# Patient Record
Sex: FEMALE | Race: BLACK OR AFRICAN AMERICAN | Employment: PART TIME | URBAN - NONMETROPOLITAN AREA
[De-identification: names, ages, dates, MRNs, and addresses within clinical notes are randomized per-mention and may not be internally consistent; named-entity substitution may affect disease eponyms.]

---

## 2017-01-03 ENCOUNTER — HOSPITAL ENCOUNTER (OUTPATIENT)
Dept: LAB | Age: 62
Discharge: HOME | End: 2017-01-03
Attending: INTERNAL MEDICINE | Admitting: INTERNAL MEDICINE

## 2017-01-03 LAB
ALBUMIN SERPL-MCNC: 3.9 GM/DL (ref 3.4–5)
ALP BLD-CCNC: 162 IU/L (ref 40–129)
ALT SERPL-CCNC: 20 U/L (ref 10–40)
ANION GAP SERPL CALCULATED.3IONS-SCNC: 10 MMOL/L (ref 4–16)
AST SERPL-CCNC: 19 IU/L (ref 15–37)
BASOPHILS ABSOLUTE: 0 K/CU MM
BASOPHILS RELATIVE PERCENT: 0.6 % (ref 0–1)
BILIRUB SERPL-MCNC: 0.3 MG/DL (ref 0–1)
BUN BLDV-MCNC: 19 MG/DL (ref 6–23)
CALCIUM SERPL-MCNC: 8.9 MG/DL (ref 8.3–10.6)
CHLORIDE BLD-SCNC: 103 MMOL/L (ref 99–110)
CO2: 28 MMOL/L (ref 21–32)
CREAT SERPL-MCNC: 1.5 MG/DL (ref 0.6–1.1)
DIFFERENTIAL TYPE: ABNORMAL
EOSINOPHILS ABSOLUTE: 0.1 K/CU MM
EOSINOPHILS RELATIVE PERCENT: 1.8 % (ref 0–3)
GFR AFRICAN AMERICAN: 42 ML/MIN/1.73M2
GFR NON-AFRICAN AMERICAN: 35 ML/MIN/1.73M2
GLUCOSE FASTING: 106 MG/DL (ref 70–99)
HCT VFR BLD CALC: 51.7 % (ref 37–47)
HEMOGLOBIN: 17.3 GM/DL (ref 12.5–16)
IMMATURE NEUTROPHIL %: 0.2 % (ref 0–0.43)
LYMPHOCYTES ABSOLUTE: 1.7 K/CU MM
LYMPHOCYTES RELATIVE PERCENT: 34.7 % (ref 24–44)
MCH RBC QN AUTO: 30.9 PG (ref 27–31)
MCHC RBC AUTO-ENTMCNC: 33.5 % (ref 32–36)
MCV RBC AUTO: 92.5 FL (ref 78–100)
MONOCYTES ABSOLUTE: 0.3 K/CU MM
MONOCYTES RELATIVE PERCENT: 5.8 % (ref 0–4)
PDW BLD-RTO: 12.2 % (ref 11.7–14.9)
PLATELET # BLD: 158 K/CU MM (ref 140–440)
PMV BLD AUTO: 9.3 FL (ref 7.5–11.1)
POTASSIUM SERPL-SCNC: 3.8 MMOL/L (ref 3.5–5.1)
RBC # BLD: 5.59 M/CU MM (ref 4.2–5.4)
SEGMENTED NEUTROPHILS ABSOLUTE COUNT: 2.8 K/CU MM
SEGMENTED NEUTROPHILS RELATIVE PERCENT: 56.9 % (ref 36–66)
SODIUM BLD-SCNC: 141 MMOL/L (ref 135–145)
TOTAL IMMATURE NEUTOROPHIL: 0.01 K/CU MM
TOTAL PROTEIN: 6.7 GM/DL (ref 6.4–8.2)
WBC # BLD: 5 K/CU MM (ref 4–10.5)

## 2017-01-04 LAB
FERRITIN: 34 NG/ML (ref 15–150)
FOLATE: >20 NG/ML (ref 3.1–17.5)
VITAMIN B-12: 1842 PG/ML (ref 211–911)

## 2017-02-01 ENCOUNTER — HOSPITAL ENCOUNTER (OUTPATIENT)
Dept: LAB | Age: 62
Discharge: HOME | End: 2017-02-01
Attending: INTERNAL MEDICINE | Admitting: INTERNAL MEDICINE

## 2017-02-01 LAB
ALBUMIN SERPL-MCNC: 3.7 GM/DL (ref 3.4–5)
ALP BLD-CCNC: 115 IU/L (ref 40–129)
ALT SERPL-CCNC: 23 U/L (ref 10–40)
ANION GAP SERPL CALCULATED.3IONS-SCNC: 4 MMOL/L (ref 4–16)
AST SERPL-CCNC: 20 IU/L (ref 15–37)
BASOPHILS ABSOLUTE: 0 K/CU MM
BASOPHILS RELATIVE PERCENT: 0.7 % (ref 0–1)
BILIRUB SERPL-MCNC: 0.6 MG/DL (ref 0–1)
BUN BLDV-MCNC: 15 MG/DL (ref 6–23)
CALCIUM SERPL-MCNC: 9.1 MG/DL (ref 8.3–10.6)
CHLORIDE BLD-SCNC: 105 MMOL/L (ref 99–110)
CO2: 33 MMOL/L (ref 21–32)
CREAT SERPL-MCNC: 1 MG/DL (ref 0.6–1.1)
DIFFERENTIAL TYPE: ABNORMAL
EOSINOPHILS ABSOLUTE: 0.1 K/CU MM
EOSINOPHILS RELATIVE PERCENT: 1.7 % (ref 0–3)
GFR AFRICAN AMERICAN: >60 ML/MIN/1.73M2
GFR NON-AFRICAN AMERICAN: 56 ML/MIN/1.73M2
GLUCOSE FASTING: 91 MG/DL (ref 70–99)
HCT VFR BLD CALC: 39.7 % (ref 37–47)
HEMOGLOBIN: 13.2 GM/DL (ref 12.5–16)
IMMATURE NEUTROPHIL %: 0.2 % (ref 0–0.43)
LACTATE DEHYDROGENASE: 177 IU/L (ref 120–246)
LYMPHOCYTES ABSOLUTE: 2.5 K/CU MM
LYMPHOCYTES RELATIVE PERCENT: 40.8 % (ref 24–44)
MCH RBC QN AUTO: 30.6 PG (ref 27–31)
MCHC RBC AUTO-ENTMCNC: 33.2 % (ref 32–36)
MCV RBC AUTO: 91.9 FL (ref 78–100)
MONOCYTES ABSOLUTE: 0.5 K/CU MM
MONOCYTES RELATIVE PERCENT: 8.2 % (ref 0–4)
PDW BLD-RTO: 11.9 % (ref 11.7–14.9)
PLATELET # BLD: 228 K/CU MM (ref 140–440)
PMV BLD AUTO: 9.4 FL (ref 7.5–11.1)
POTASSIUM SERPL-SCNC: 3.9 MMOL/L (ref 3.5–5.1)
RBC # BLD: 4.32 M/CU MM (ref 4.2–5.4)
SEGMENTED NEUTROPHILS ABSOLUTE COUNT: 2.9 K/CU MM
SEGMENTED NEUTROPHILS RELATIVE PERCENT: 48.4 % (ref 36–66)
SODIUM BLD-SCNC: 142 MMOL/L (ref 135–145)
TOTAL IMMATURE NEUTOROPHIL: 0.01 K/CU MM
TOTAL PROTEIN: 6.2 GM/DL (ref 6.4–8.2)
WBC # BLD: 6 K/CU MM (ref 4–10.5)

## 2017-02-02 LAB — ERYTHROPOIETIN: 16

## 2017-02-12 LAB — JAK2 GENE MUTATION QUAL: NORMAL

## 2017-02-16 ENCOUNTER — HOSPITAL ENCOUNTER (OUTPATIENT)
Dept: CT IMAGING | Age: 62
Discharge: HOME | End: 2017-02-16
Attending: INTERNAL MEDICINE | Admitting: INTERNAL MEDICINE

## 2017-02-16 DIAGNOSIS — R91.8 LUNG MASS: ICD-10-CM

## 2017-06-13 ENCOUNTER — HOSPITAL ENCOUNTER (OUTPATIENT)
Dept: LAB | Age: 62
Discharge: HOME | End: 2017-06-13
Attending: INTERNAL MEDICINE | Admitting: INTERNAL MEDICINE

## 2017-06-13 LAB
ALBUMIN SERPL-MCNC: 3.5 GM/DL (ref 3.4–5)
ALP BLD-CCNC: 123 IU/L (ref 40–129)
ALT SERPL-CCNC: 22 U/L (ref 10–40)
ANION GAP SERPL CALCULATED.3IONS-SCNC: 5 MMOL/L (ref 4–16)
AST SERPL-CCNC: 18 IU/L (ref 15–37)
BASOPHILS ABSOLUTE: 0 K/CU MM
BASOPHILS RELATIVE PERCENT: 0.5 % (ref 0–1)
BILIRUB SERPL-MCNC: 0.5 MG/DL (ref 0–1)
BUN BLDV-MCNC: 18 MG/DL (ref 6–23)
CALCIUM SERPL-MCNC: 8.8 MG/DL (ref 8.3–10.6)
CHLORIDE BLD-SCNC: 105 MMOL/L (ref 99–110)
CO2: 30 MMOL/L (ref 21–32)
CREAT SERPL-MCNC: 1.1 MG/DL (ref 0.6–1.1)
DIFFERENTIAL TYPE: ABNORMAL
EOSINOPHILS ABSOLUTE: 0.1 K/CU MM
EOSINOPHILS RELATIVE PERCENT: 1.6 % (ref 0–3)
GFR AFRICAN AMERICAN: >60 ML/MIN/1.73M2
GFR NON-AFRICAN AMERICAN: 50 ML/MIN/1.73M2
GLUCOSE BLD-MCNC: 107 MG/DL (ref 70–140)
HCT VFR BLD CALC: 38.2 % (ref 37–47)
HEMOGLOBIN: 12.6 GM/DL (ref 12.5–16)
IMMATURE NEUTROPHIL %: 0.2 % (ref 0–0.43)
LACTATE DEHYDROGENASE: 195 IU/L (ref 120–246)
LYMPHOCYTES ABSOLUTE: 2.1 K/CU MM
LYMPHOCYTES RELATIVE PERCENT: 37.7 % (ref 24–44)
MCH RBC QN AUTO: 30.6 PG (ref 27–31)
MCHC RBC AUTO-ENTMCNC: 33 % (ref 32–36)
MCV RBC AUTO: 92.7 FL (ref 78–100)
MONOCYTES ABSOLUTE: 0.5 K/CU MM
MONOCYTES RELATIVE PERCENT: 9.2 % (ref 0–4)
PDW BLD-RTO: 12.5 % (ref 11.7–14.9)
PLATELET # BLD: 196 K/CU MM (ref 140–440)
PMV BLD AUTO: 8.9 FL (ref 7.5–11.1)
POTASSIUM SERPL-SCNC: 3.9 MMOL/L (ref 3.5–5.1)
RBC # BLD: 4.12 M/CU MM (ref 4.2–5.4)
SEGMENTED NEUTROPHILS ABSOLUTE COUNT: 2.8 K/CU MM
SEGMENTED NEUTROPHILS RELATIVE PERCENT: 50.8 % (ref 36–66)
SODIUM BLD-SCNC: 140 MMOL/L (ref 135–145)
TOTAL IMMATURE NEUTOROPHIL: 0.01 K/CU MM
TOTAL PROTEIN: 6.1 GM/DL (ref 6.4–8.2)
WBC # BLD: 5.5 K/CU MM (ref 4–10.5)

## 2017-09-07 ENCOUNTER — HOSPITAL ENCOUNTER (OUTPATIENT)
Dept: LAB | Age: 62
Discharge: HOME | End: 2017-09-07
Attending: SURGERY | Admitting: SURGERY

## 2017-09-07 LAB
ALBUMIN SERPL-MCNC: 3.8 GM/DL (ref 3.4–5)
ALP BLD-CCNC: 126 IU/L (ref 40–129)
ALT SERPL-CCNC: 19 U/L (ref 10–40)
ANION GAP SERPL CALCULATED.3IONS-SCNC: 8 MMOL/L (ref 4–16)
AST SERPL-CCNC: 17 IU/L (ref 15–37)
BASOPHILS ABSOLUTE: 0 K/CU MM
BASOPHILS RELATIVE PERCENT: 0.5 % (ref 0–1)
BILIRUB SERPL-MCNC: 0.7 MG/DL (ref 0–1)
BUN BLDV-MCNC: 21 MG/DL (ref 6–23)
CALCIUM SERPL-MCNC: 9.2 MG/DL (ref 8.3–10.6)
CHLORIDE BLD-SCNC: 106 MMOL/L (ref 99–110)
CHOLESTEROL, FASTING: 233 MG/DL
CO2: 30 MMOL/L (ref 21–32)
CREAT SERPL-MCNC: 1 MG/DL (ref 0.6–1.1)
DIFFERENTIAL TYPE: ABNORMAL
EOSINOPHILS ABSOLUTE: 0 K/CU MM
EOSINOPHILS RELATIVE PERCENT: 0.7 % (ref 0–3)
FERRITIN: 40 NG/ML (ref 15–150)
FOLATE: >20 NG/ML (ref 3.1–17.5)
GFR AFRICAN AMERICAN: >60 ML/MIN/1.73M2
GFR NON-AFRICAN AMERICAN: 56 ML/MIN/1.73M2
GLUCOSE FASTING: 92 MG/DL (ref 70–99)
HCT VFR BLD CALC: 42 % (ref 37–47)
HDLC SERPL-MCNC: 106 MG/DL
HEMOGLOBIN: 13.7 GM/DL (ref 12.5–16)
IMMATURE NEUTROPHIL %: 0.2 % (ref 0–0.43)
IRON: 108 UG/DL (ref 37–145)
LDL CHOLESTEROL DIRECT: 132 MG/DL
LYMPHOCYTES ABSOLUTE: 2.1 K/CU MM
LYMPHOCYTES RELATIVE PERCENT: 34.2 % (ref 24–44)
MAGNESIUM: 2.2 MG/DL (ref 1.8–2.4)
MCH RBC QN AUTO: 30 PG (ref 27–31)
MCHC RBC AUTO-ENTMCNC: 32.6 % (ref 32–36)
MCV RBC AUTO: 92.1 FL (ref 78–100)
MONOCYTES ABSOLUTE: 0.5 K/CU MM
MONOCYTES RELATIVE PERCENT: 7.4 % (ref 0–4)
PCT TRANSFERRIN: 43 % (ref 10–44)
PDW BLD-RTO: 11.9 % (ref 11.7–14.9)
PHOSPHORUS: 3.4 MG/DL (ref 2.5–4.9)
PLATELET # BLD: 212 K/CU MM (ref 140–440)
PMV BLD AUTO: 9.6 FL (ref 7.5–11.1)
POTASSIUM SERPL-SCNC: 4.2 MMOL/L (ref 3.5–5.1)
PREALBUMIN: 22 MG/DL (ref 20–40)
RBC # BLD: 4.56 M/CU MM (ref 4.2–5.4)
SEGMENTED NEUTROPHILS ABSOLUTE COUNT: 3.5 K/CU MM
SEGMENTED NEUTROPHILS RELATIVE PERCENT: 57 % (ref 36–66)
SODIUM BLD-SCNC: 144 MMOL/L (ref 135–145)
T4 FREE: 1.28 NG/DL (ref 0.9–1.8)
TOTAL IMMATURE NEUTOROPHIL: 0.01 K/CU MM
TOTAL IRON BINDING CAPACITY: 251 UG/DL (ref 250–450)
TOTAL PROTEIN: 6.8 GM/DL (ref 6.4–8.2)
TRIGLYCERIDE, FASTING: 51 MG/DL
TSH HIGH SENSITIVITY: 0.94 UIU/ML (ref 0.27–4.2)
UNSATURATED IRON BINDING CAPACITY: 143 UG/DL (ref 110–370)
VITAMIN B-12: >2000 PG/ML (ref 211–911)
VITAMIN D 25-HYDROXY: 51.96 NG/ML
WBC # BLD: 6.1 K/CU MM (ref 4–10.5)

## 2017-09-09 LAB
COPPER: 124
RETINYL PALMITATE: <0.02
VITAMIN A LEVEL: 0.59
VITAMIN A, INTERP: NORMAL
ZINC: 63

## 2017-09-10 LAB — VITAMIN K: 0.7

## 2018-01-05 ENCOUNTER — HOSPITAL ENCOUNTER (OUTPATIENT)
Dept: CT IMAGING | Age: 63
Discharge: HOME | End: 2018-01-05
Attending: INTERNAL MEDICINE | Admitting: INTERNAL MEDICINE

## 2018-01-05 DIAGNOSIS — R91.8 LUNG MASS: ICD-10-CM

## 2018-01-05 DIAGNOSIS — R91.8 OTHER NONSPECIFIC ABNORMAL FINDING OF LUNG FIELD (CODE): ICD-10-CM

## 2018-01-09 ENCOUNTER — HOSPITAL ENCOUNTER (OUTPATIENT)
Dept: ULTRASOUND IMAGING | Age: 63
Discharge: HOME | End: 2018-01-09
Attending: INTERNAL MEDICINE | Admitting: INTERNAL MEDICINE

## 2018-01-09 DIAGNOSIS — R60.9 EDEMA, UNSPECIFIED TYPE: ICD-10-CM

## 2018-03-06 ENCOUNTER — HOSPITAL ENCOUNTER (OUTPATIENT)
Dept: MAMMOGRAPHY | Age: 63
Discharge: HOME | End: 2018-03-07
Attending: FAMILY MEDICINE | Admitting: FAMILY MEDICINE

## 2018-03-06 DIAGNOSIS — Z12.31 VISIT FOR SCREENING MAMMOGRAM: ICD-10-CM

## 2018-04-09 ENCOUNTER — HOSPITAL ENCOUNTER (OUTPATIENT)
Dept: LAB | Age: 63
Discharge: HOME | End: 2018-04-09
Attending: INTERNAL MEDICINE | Admitting: INTERNAL MEDICINE

## 2018-04-09 LAB
ALBUMIN SERPL-MCNC: 3.8 GM/DL (ref 3.4–5)
ALP BLD-CCNC: 134 IU/L (ref 40–129)
ALT SERPL-CCNC: 20 U/L (ref 10–40)
ANION GAP SERPL CALCULATED.3IONS-SCNC: 9 MMOL/L (ref 4–16)
AST SERPL-CCNC: 19 IU/L (ref 15–37)
BILIRUB SERPL-MCNC: 0.4 MG/DL (ref 0–1)
BUN BLDV-MCNC: 16 MG/DL (ref 6–23)
CALCIUM SERPL-MCNC: 8.6 MG/DL (ref 8.3–10.6)
CHLORIDE BLD-SCNC: 105 MMOL/L (ref 99–110)
CO2: 30 MMOL/L (ref 21–32)
CREAT SERPL-MCNC: 1 MG/DL (ref 0.6–1.1)
FERRITIN: 62 NG/ML (ref 15–150)
FOLATE: >20 NG/ML (ref 3.1–17.5)
GFR AFRICAN AMERICAN: >60 ML/MIN/1.73M2
GFR NON-AFRICAN AMERICAN: 56 ML/MIN/1.73M2
GLUCOSE FASTING: 78 MG/DL (ref 70–99)
POTASSIUM SERPL-SCNC: 3.7 MMOL/L (ref 3.5–5.1)
SODIUM BLD-SCNC: 144 MMOL/L (ref 135–145)
TOTAL PROTEIN: 6.7 GM/DL (ref 6.4–8.2)
VITAMIN B-12: >2000 PG/ML (ref 211–911)

## 2018-04-11 LAB
VITAMIN D 1,25-DIHYDROXY: 69.8
VITAMIN D 25-HYDROXY: 36.91 NG/ML

## 2018-06-14 ENCOUNTER — HOSPITAL ENCOUNTER (OUTPATIENT)
Dept: SLEEP CENTER | Age: 63
Discharge: HOME | End: 2018-06-14
Attending: FAMILY MEDICINE | Admitting: FAMILY MEDICINE

## 2018-06-14 VITALS
SYSTOLIC BLOOD PRESSURE: 132 MMHG | OXYGEN SATURATION: 99 % | HEART RATE: 68 BPM | HEIGHT: 63 IN | WEIGHT: 159.4 LBS | DIASTOLIC BLOOD PRESSURE: 62 MMHG | BODY MASS INDEX: 28.24 KG/M2

## 2018-06-14 DIAGNOSIS — G47.33 OBSTRUCTIVE SLEEP APNEA SYNDROME: Primary | ICD-10-CM

## 2018-06-14 PROCEDURE — 99244 OFF/OP CNSLTJ NEW/EST MOD 40: CPT | Performed by: INTERNAL MEDICINE

## 2018-06-14 ASSESSMENT — SLEEP AND FATIGUE QUESTIONNAIRES
HOW LIKELY ARE YOU TO NOD OFF OR FALL ASLEEP WHILE SITTING INACTIVE IN A PUBLIC PLACE: 1
HOW LIKELY ARE YOU TO NOD OFF OR FALL ASLEEP WHILE SITTING AND TALKING TO SOMEONE: 1
HOW LIKELY ARE YOU TO NOD OFF OR FALL ASLEEP WHEN YOU ARE A PASSENGER IN A CAR FOR AN HOUR WITHOUT A BREAK: 2
ESS TOTAL SCORE: 11
NECK CIRCUMFERENCE (INCHES): 13
HOW LIKELY ARE YOU TO NOD OFF OR FALL ASLEEP IN A CAR, WHILE STOPPED FOR A FEW MINUTES IN TRAFFIC: 0
HOW LIKELY ARE YOU TO NOD OFF OR FALL ASLEEP WHILE SITTING QUIETLY AFTER LUNCH WITHOUT ALCOHOL: 1
HOW LIKELY ARE YOU TO NOD OFF OR FALL ASLEEP WHILE SITTING AND READING: 2
HOW LIKELY ARE YOU TO NOD OFF OR FALL ASLEEP WHILE LYING DOWN TO REST IN THE AFTERNOON WHEN CIRCUMSTANCES PERMIT: 3
HOW LIKELY ARE YOU TO NOD OFF OR FALL ASLEEP WHILE WATCHING TV: 1

## 2018-06-29 ENCOUNTER — HOSPITAL ENCOUNTER (OUTPATIENT)
Dept: SLEEP CENTER | Age: 63
Discharge: HOME | End: 2018-06-29
Attending: INTERNAL MEDICINE | Admitting: INTERNAL MEDICINE

## 2018-06-29 ASSESSMENT — SLEEP AND FATIGUE QUESTIONNAIRES
ARE YOU TIRED DURING WAKE TIME: 3-4 TIMES A WEEK
SNORING VOLUME: SLIGHTLY LOUDER THAN BREATHING
HOW LIKELY ARE YOU TO NOD OFF OR FALL ASLEEP WHILE SITTING AND READING: 2
I SLEEP WELL: YES
HOW LIKELY ARE YOU TO NOD OFF OR FALL ASLEEP WHILE SITTING QUIETLY AFTER LUNCH WITHOUT ALCOHOL: 1
HOW MANY NAPS DO YOU TAKE PER WEEK: 0
HOW LIKELY ARE YOU TO NOD OFF OR FALL ASLEEP WHEN YOU ARE A PASSENGER IN A CAR FOR AN HOUR WITHOUT A BREAK: 0
HOW LIKELY ARE YOU TO NOD OFF OR FALL ASLEEP IN A CAR, WHILE STOPPED FOR A FEW MINUTES IN TRAFFIC: 0
HOW LIKELY ARE YOU TO NOD OFF OR FALL ASLEEP WHILE SITTING AND TALKING TO SOMEONE: 2
HAS ANYONE NOTICED THAT YOU QUIT BREATHING DURING SLEEP: ALMOST DAILY
HAVE YOU EVER NODDED OFF OR FALLEN ASLEEP WHILE DRIVING: NO
ESS TOTAL SCORE: 11
HOW OFTEN DO YOU SNORE: 1-2 TIMES A WEEK
USUAL AMOUNT OF TIME TO FALL ASLEEP (MIN): 5
FOR THE FIRST 30 MINUTES AFTER WAKING, I AM: ALERT
HOW LIKELY ARE YOU TO NOD OFF OR FALL ASLEEP WHILE WATCHING TV: 2
NUMBER OF TIMES YOU WAKE PER NIGHT: 1
HOW LIKELY ARE YOU TO NOD OFF OR FALL ASLEEP WHILE SITTING INACTIVE IN A PUBLIC PLACE: 2
HOW LIKELY ARE YOU TO NOD OFF OR FALL ASLEEP WHILE LYING DOWN TO REST IN THE AFTERNOON WHEN CIRCUMSTANCES PERMIT: 2
I DO OR HAVE BEEN TOLD I SNORE DURING SLEEP: YES, AS AN ADULT OR ADOLESCENT
WHAT TIME DO YOU USUALLY WAKE UP: 23400
ARE YOU TIRED AFTER SLEEPING: 3-4 TIMES A WEEK
DO YOU SNORE: YES
WHAT TIME DO YOU USUALLY GO TO BED: 32400
DO YOU HAVE HIGH BLOOD PRESSURE: NO
DOES YOUR SNORING BOTHER OTHERS: NO
NORMAL AMOUNT OF SLEEP PER NIGHT: 5

## 2018-08-23 ENCOUNTER — HOSPITAL ENCOUNTER (OUTPATIENT)
Dept: SLEEP CENTER | Age: 63
Discharge: HOME | End: 2018-08-23
Attending: INTERNAL MEDICINE | Admitting: INTERNAL MEDICINE

## 2018-08-23 DIAGNOSIS — E66.9 OBESITY (BMI 30.0-34.9): ICD-10-CM

## 2018-08-23 DIAGNOSIS — G47.33 OSA (OBSTRUCTIVE SLEEP APNEA): ICD-10-CM

## 2018-08-23 PROBLEM — E66.811 OBESITY (BMI 30.0-34.9): Status: ACTIVE | Noted: 2018-08-23

## 2018-08-23 PROCEDURE — 99213 OFFICE O/P EST LOW 20 MIN: CPT | Performed by: INTERNAL MEDICINE

## 2018-08-23 RX ORDER — FLUTICASONE PROPIONATE 50 MCG
SPRAY, SUSPENSION (ML) NASAL
COMMUNITY
Start: 2016-02-18

## 2018-08-23 RX ORDER — BIOTIN 1 MG
1 TABLET ORAL
COMMUNITY

## 2018-08-23 RX ORDER — CALCIUM CARBONATE 500(1250)
2 TABLET ORAL
COMMUNITY

## 2018-08-23 RX ORDER — THIAMINE HCL 100 MG
1 TABLET ORAL
COMMUNITY

## 2018-08-23 ASSESSMENT — ENCOUNTER SYMPTOMS
EYES NEGATIVE: 1
RESPIRATORY NEGATIVE: 1
GASTROINTESTINAL NEGATIVE: 1

## 2018-08-23 NOTE — PROGRESS NOTES
Gurpreet Moses  1955  Referring Provider: Dr. Sobia Mccollum    Subjective:     Chief Complaint   Patient presents with    2 Week Follow-Up    Daytime Sleepiness       HPI  Konrad Knowles is a 57 y.o. female has come back as a follow up. She has been diagnosed with MARQUISE about 10 years ago. She had a gastric bypass surgery and lost about 110 lbs. She was feeling that her CPAP pressure was too much. So she had  a PSG done on 06/29/18 showed that she has an AHI of less than 1 with sats in 91%. She is sometimes sleepy at 9 to 10 PM but not during the day time. She has lost about 2 lbs since her last visit. Current Outpatient Prescriptions   Medication Sig Dispense Refill    Clotrimazole (ALEVAZOL) 1 % OINT Apply topically      Biotin 1000 MCG TABS Take 1 mg by mouth      fluticasone (FLONASE) 50 MCG/ACT nasal spray 1 spray by Nasal route daily as needed      calcium carbonate (OSCAL) 500 MG TABS tablet Take 2 tablets by mouth      Cyanocobalamin (VITAMIN B-12) 2500 MCG SUBL Place 1 each under the tongue      oxyCODONE-acetaminophen (PERCOCET) 5-325 MG per tablet Take 1-2 tablets by mouth every 6 hours as needed. Noemi Chatterjee magnesium oxide (MAG-OX) 400 MG tablet Take 400 mg by mouth daily      naproxen (NAPROSYN) 500 MG tablet Take 1 tablet by mouth 2 times daily 10 tablet 0    zinc gluconate 50 MG tablet Take 50 mg by mouth daily      Multiple Vitamins-Minerals (ALIVE WOMENS GUMMY) CHEW Take 2 tablets by mouth daily      B Complex Vitamins (VITAMIN B COMPLEX PO) Take by mouth      potassium chloride (KLOR-CON) 20 MEQ packet Take 40 mEq by mouth daily.  fluticasone-salmeterol (ADVAIR) 250-50 MCG/DOSE AEPB Inhale 1 puff into the lungs every 12 hours.  Rivaroxaban (XARELTO PO) Take 20 mg by mouth daily.       vitamin D (ERGOCALCIFEROL) 13203 UNITS CAPS capsule Take 10,000 Units by mouth Twice a Week       calcium carbonate (OSCAL) 500 MG TABS tablet Take 500 mg by mouth daily       No current

## 2018-12-07 ENCOUNTER — TELEPHONE (OUTPATIENT)
Dept: SLEEP CENTER | Age: 63
End: 2018-12-07

## 2018-12-10 ENCOUNTER — TELEPHONE (OUTPATIENT)
Dept: SLEEP CENTER | Age: 63
End: 2018-12-10

## 2018-12-14 ENCOUNTER — HOSPITAL ENCOUNTER (OUTPATIENT)
Age: 63
Discharge: HOME | End: 2018-12-14
Payer: COMMERCIAL

## 2018-12-14 LAB
ALBUMIN SERPL-MCNC: 4.1 GM/DL (ref 3.4–5)
ALP BLD-CCNC: 154 IU/L (ref 40–129)
ALT SERPL-CCNC: 25 U/L (ref 10–40)
ANION GAP SERPL CALCULATED.3IONS-SCNC: 12 MMOL/L (ref 4–16)
AST SERPL-CCNC: 22 IU/L (ref 15–37)
BASOPHILS ABSOLUTE: 0 K/CU MM
BASOPHILS RELATIVE PERCENT: 0.5 % (ref 0–1)
BILIRUB SERPL-MCNC: 0.4 MG/DL (ref 0–1)
BUN BLDV-MCNC: 20 MG/DL (ref 6–23)
CALCIUM SERPL-MCNC: 8.9 MG/DL (ref 8.3–10.6)
CHLORIDE BLD-SCNC: 103 MMOL/L (ref 99–110)
CO2: 27 MMOL/L (ref 21–32)
CREAT SERPL-MCNC: 1 MG/DL (ref 0.6–1.1)
DIFFERENTIAL TYPE: ABNORMAL
EOSINOPHILS ABSOLUTE: 0.1 K/CU MM
EOSINOPHILS RELATIVE PERCENT: 1.3 % (ref 0–3)
FERRITIN: 21 NG/ML (ref 15–150)
FOLATE: >20 NG/ML (ref 3.1–17.5)
GFR AFRICAN AMERICAN: >60 ML/MIN/1.73M2
GFR NON-AFRICAN AMERICAN: 56 ML/MIN/1.73M2
GLUCOSE BLD-MCNC: 98 MG/DL (ref 70–99)
HCT VFR BLD CALC: 37.9 % (ref 37–47)
HEMOGLOBIN: 11.9 GM/DL (ref 12.5–16)
IMMATURE NEUTROPHIL %: 0.2 % (ref 0–0.43)
IRON: 49 UG/DL (ref 37–145)
LYMPHOCYTES ABSOLUTE: 1.9 K/CU MM
LYMPHOCYTES RELATIVE PERCENT: 30.5 % (ref 24–44)
MCH RBC QN AUTO: 28.4 PG (ref 27–31)
MCHC RBC AUTO-ENTMCNC: 31.4 % (ref 32–36)
MCV RBC AUTO: 90.5 FL (ref 78–100)
MONOCYTES ABSOLUTE: 0.6 K/CU MM
MONOCYTES RELATIVE PERCENT: 8.9 % (ref 0–4)
PCT TRANSFERRIN: 14 % (ref 10–44)
PDW BLD-RTO: 12.1 % (ref 11.7–14.9)
PLATELET # BLD: 247 K/CU MM (ref 140–440)
PMV BLD AUTO: 9.3 FL (ref 7.5–11.1)
POTASSIUM SERPL-SCNC: 4 MMOL/L (ref 3.5–5.1)
RBC # BLD: 4.19 M/CU MM (ref 4.2–5.4)
SEGMENTED NEUTROPHILS ABSOLUTE COUNT: 3.6 K/CU MM
SEGMENTED NEUTROPHILS RELATIVE PERCENT: 58.6 % (ref 36–66)
SODIUM BLD-SCNC: 142 MMOL/L (ref 135–145)
TOTAL IMMATURE NEUTOROPHIL: 0.01 K/CU MM
TOTAL IRON BINDING CAPACITY: 359 UG/DL (ref 250–450)
TOTAL PROTEIN: 6.8 GM/DL (ref 6.4–8.2)
UNSATURATED IRON BINDING CAPACITY: 310 UG/DL (ref 110–370)
VITAMIN B-12: >2000 PG/ML (ref 211–911)
VITAMIN D 25-HYDROXY: 38.1 NG/ML
WBC # BLD: 6.2 K/CU MM (ref 4–10.5)

## 2018-12-14 PROCEDURE — 82306 VITAMIN D 25 HYDROXY: CPT

## 2018-12-14 PROCEDURE — 82607 VITAMIN B-12: CPT

## 2018-12-14 PROCEDURE — 83540 ASSAY OF IRON: CPT

## 2018-12-14 PROCEDURE — 82728 ASSAY OF FERRITIN: CPT

## 2018-12-14 PROCEDURE — 83550 IRON BINDING TEST: CPT

## 2018-12-14 PROCEDURE — 80053 COMPREHEN METABOLIC PANEL: CPT

## 2018-12-14 PROCEDURE — 85025 COMPLETE CBC W/AUTO DIFF WBC: CPT

## 2018-12-14 PROCEDURE — 36415 COLL VENOUS BLD VENIPUNCTURE: CPT

## 2018-12-14 PROCEDURE — 82746 ASSAY OF FOLIC ACID SERUM: CPT

## 2019-06-26 ENCOUNTER — TELEPHONE (OUTPATIENT)
Dept: SLEEP CENTER | Age: 64
End: 2019-06-26

## 2019-06-26 NOTE — TELEPHONE ENCOUNTER
Patient called and states Dr. Precious Gardner told her that she didn't need the CPAP any longer and she stopped using but [facility] is still sending her supplies and making her pay for the supplies. Dr. Precious Gardner is here today and information will be shared with him on this issue.

## 2020-01-08 ENCOUNTER — HOSPITAL ENCOUNTER (OUTPATIENT)
Dept: INFUSION THERAPY | Age: 65
Discharge: HOME | End: 2020-01-08
Payer: COMMERCIAL

## 2020-01-08 LAB
ALBUMIN SERPL-MCNC: 4.2 GM/DL (ref 3.4–5)
ALP BLD-CCNC: 168 IU/L (ref 40–129)
ALT SERPL-CCNC: 17 U/L (ref 10–40)
ANION GAP SERPL CALCULATED.3IONS-SCNC: 13 MMOL/L (ref 4–16)
AST SERPL-CCNC: 18 IU/L (ref 15–37)
BILIRUB SERPL-MCNC: 0.2 MG/DL (ref 0–1)
BUN BLDV-MCNC: 24 MG/DL (ref 6–23)
CALCIUM SERPL-MCNC: 9.2 MG/DL (ref 8.3–10.6)
CHLORIDE BLD-SCNC: 102 MMOL/L (ref 99–110)
CO2: 26 MMOL/L (ref 21–32)
CREAT SERPL-MCNC: 1.3 MG/DL (ref 0.6–1.1)
DIFFERENTIAL TYPE: ABNORMAL
EOSINOPHILS ABSOLUTE: 0.1 K/CU MM
EOSINOPHILS RELATIVE PERCENT: 1.9 % (ref 0–3)
FERRITIN: 19 NG/ML (ref 15–150)
FOLATE: >20 NG/ML (ref 3.1–17.5)
GFR AFRICAN AMERICAN: 50 ML/MIN/1.73M2
GFR NON-AFRICAN AMERICAN: 41 ML/MIN/1.73M2
GLUCOSE BLD-MCNC: 97 MG/DL (ref 70–99)
HCT VFR BLD CALC: 37.4 % (ref 37–47)
HEMOGLOBIN: 11.9 GM/DL (ref 12.5–16)
IRON: 29 UG/DL (ref 37–145)
LYMPHOCYTES ABSOLUTE: 2 K/CU MM
LYMPHOCYTES RELATIVE PERCENT: 29.1 % (ref 24–44)
MCH RBC QN AUTO: 27 PG (ref 27–31)
MCHC RBC AUTO-ENTMCNC: 31.8 % (ref 32–36)
MCV RBC AUTO: 85 FL (ref 78–100)
MONOCYTES ABSOLUTE: 0.5 K/CU MM
MONOCYTES RELATIVE PERCENT: 8 % (ref 0–4)
PCT TRANSFERRIN: 7 % (ref 10–44)
PDW BLD-RTO: 13.5 % (ref 11.7–14.9)
PLATELET # BLD: 325 K/CU MM (ref 140–440)
PMV BLD AUTO: 9.1 FL (ref 7.5–11.1)
POTASSIUM SERPL-SCNC: 4.8 MMOL/L (ref 3.5–5.1)
RBC # BLD: 4.4 M/CU MM (ref 4.2–5.4)
SEGMENTED NEUTROPHILS ABSOLUTE COUNT: 4.2 K/CU MM
SEGMENTED NEUTROPHILS RELATIVE PERCENT: 61 % (ref 36–66)
SODIUM BLD-SCNC: 141 MMOL/L (ref 135–145)
TOTAL IRON BINDING CAPACITY: 434 UG/DL (ref 250–450)
TOTAL PROTEIN: 7 GM/DL (ref 6.4–8.2)
UNSATURATED IRON BINDING CAPACITY: 405 UG/DL (ref 110–370)
VITAMIN B-12: >2000 PG/ML (ref 211–911)
VITAMIN D 25-HYDROXY: 46.13 NG/ML
WBC # BLD: 6.8 K/CU MM (ref 4–10.5)

## 2020-01-08 PROCEDURE — 85025 COMPLETE CBC W/AUTO DIFF WBC: CPT

## 2020-01-08 PROCEDURE — 80053 COMPREHEN METABOLIC PANEL: CPT

## 2020-01-08 PROCEDURE — 82728 ASSAY OF FERRITIN: CPT

## 2020-01-08 PROCEDURE — 83540 ASSAY OF IRON: CPT

## 2020-01-08 PROCEDURE — 82306 VITAMIN D 25 HYDROXY: CPT

## 2020-01-08 PROCEDURE — 83550 IRON BINDING TEST: CPT

## 2020-01-08 PROCEDURE — 82607 VITAMIN B-12: CPT

## 2020-01-08 PROCEDURE — 82746 ASSAY OF FOLIC ACID SERUM: CPT

## 2020-01-08 PROCEDURE — 36415 COLL VENOUS BLD VENIPUNCTURE: CPT

## 2020-02-06 ENCOUNTER — TELEPHONE (OUTPATIENT)
Dept: SLEEP CENTER | Age: 65
End: 2020-02-06

## 2020-02-06 NOTE — TELEPHONE ENCOUNTER
Dr. Vinicius Gunter office called for consult notes, sleep study results and follow-ups with Dr. Bradley Schneider. Faxed to the referring physician, Dr. Wilner Esquivel at [phone].

## 2020-11-18 PROBLEM — Z98.84 BARIATRIC SURGERY STATUS: Status: ACTIVE | Noted: 2020-11-18

## 2020-11-18 PROBLEM — I82.5Z9: Status: ACTIVE | Noted: 2020-11-18

## 2020-11-18 PROBLEM — D50.0 IRON DEFICIENCY ANEMIA SECONDARY TO BLOOD LOSS (CHRONIC): Status: ACTIVE | Noted: 2020-11-18

## 2020-11-18 PROBLEM — I51.7 CARDIOMEGALY: Status: ACTIVE | Noted: 2020-11-18

## 2020-11-18 PROBLEM — E55.9 VITAMIN D DEFICIENCY, UNSPECIFIED: Status: ACTIVE | Noted: 2020-11-18

## 2020-11-18 PROBLEM — R91.8 OTHER NONSPECIFIC ABNORMAL FINDING OF LUNG FIELD: Status: ACTIVE | Noted: 2020-11-18

## 2021-01-11 DIAGNOSIS — E55.9 VITAMIN D DEFICIENCY, UNSPECIFIED: ICD-10-CM

## 2021-01-11 DIAGNOSIS — D50.0 IRON DEFICIENCY ANEMIA SECONDARY TO BLOOD LOSS (CHRONIC): ICD-10-CM

## 2021-01-11 DIAGNOSIS — E66.9 OBESITY (BMI 30.0-34.9): Primary | ICD-10-CM

## 2021-04-01 ENCOUNTER — HOSPITAL ENCOUNTER (OUTPATIENT)
Dept: SLEEP CENTER | Age: 66
Discharge: HOME | End: 2021-04-01
Payer: COMMERCIAL

## 2021-04-01 VITALS — HEIGHT: 62 IN | WEIGHT: 197 LBS | BODY MASS INDEX: 36.25 KG/M2

## 2021-04-01 DIAGNOSIS — G47.33 OSA (OBSTRUCTIVE SLEEP APNEA): ICD-10-CM

## 2021-04-01 DIAGNOSIS — E66.9 OBESITY (BMI 30.0-34.9): ICD-10-CM

## 2021-04-01 DIAGNOSIS — G47.10 HYPERSOMNIA: ICD-10-CM

## 2021-04-01 PROCEDURE — 99211 OFF/OP EST MAY X REQ PHY/QHP: CPT

## 2021-04-01 PROCEDURE — 99214 OFFICE O/P EST MOD 30 MIN: CPT | Performed by: INTERNAL MEDICINE

## 2021-04-01 RX ORDER — MELATONIN 10 MG
10 CAPSULE ORAL NIGHTLY
COMMUNITY

## 2021-04-01 RX ORDER — MULTIVIT-MIN/IRON/FOLIC ACID/K 18-600-40
CAPSULE ORAL
COMMUNITY

## 2021-04-01 RX ORDER — TRAZODONE HYDROCHLORIDE 50 MG/1
50 TABLET ORAL NIGHTLY
COMMUNITY

## 2021-04-01 RX ORDER — ESCITALOPRAM OXALATE 10 MG/1
10 TABLET ORAL DAILY
COMMUNITY

## 2021-04-01 ASSESSMENT — SLEEP AND FATIGUE QUESTIONNAIRES
HOW LIKELY ARE YOU TO NOD OFF OR FALL ASLEEP WHILE SITTING INACTIVE IN A PUBLIC PLACE: 0
HOW LIKELY ARE YOU TO NOD OFF OR FALL ASLEEP IN A CAR, WHILE STOPPED FOR A FEW MINUTES IN TRAFFIC: 0
ESS TOTAL SCORE: 11
HOW LIKELY ARE YOU TO NOD OFF OR FALL ASLEEP WHILE LYING DOWN TO REST IN THE AFTERNOON WHEN CIRCUMSTANCES PERMIT: 0
HOW LIKELY ARE YOU TO NOD OFF OR FALL ASLEEP WHILE SITTING QUIETLY AFTER LUNCH WITHOUT ALCOHOL: 2
HOW LIKELY ARE YOU TO NOD OFF OR FALL ASLEEP WHILE SITTING AND READING: 3
HOW LIKELY ARE YOU TO NOD OFF OR FALL ASLEEP WHEN YOU ARE A PASSENGER IN A CAR FOR AN HOUR WITHOUT A BREAK: 2
HOW LIKELY ARE YOU TO NOD OFF OR FALL ASLEEP WHILE WATCHING TV: 2

## 2021-04-01 NOTE — CONSULTS
Zafar Lowry MD, Gianni Hicks MD, Cindy Richardson MD, [facility]      [address], [address] [facility]   PH: [phone]  F: [phone]     Subjective:     Patient ID: Felicia Delgadillo is a 77 y.o. female, referred to the sleep center for   Chief Complaint   Patient presents with    Sleep Apnea    Insomnia   . Referring physician:  Dr. Marco Antonio Manley doing a VIDEO consult. She has been diagnosed with MARQUISE about 6 years ago at [facility]. She is not on the CPAP for 3 years. She had a Gastric bypass surgery about 4 years ago.  She lost about 110 lbs but put some back on    Symptoms:   [x]  Snoring                                                                    [x]  Dry Mouth  []  Choking                                                                   []  Morning Headaches  []  Gasping for Air                                                        []  Trouble Falling asleep  [x]  Tired during the daytime                                         []  Trouble Staying Asleep  [x]  Tired when you wake up                                         [x]  Weight Gain in Last 5 Years  []  Wake up frequently at night                                    []  Weight Loss in Last 5 Years  []  Shortness Of Breath                                               []  Shift Worker  []  Coughing                                                                []  Smoker (Previous or Current)  []  Chest Pain                                                              []  Anxiety  []  Trouble keeping your legs still at night                   []  Depression  []  Kicking your legs in your sleep                               []  Insomnia       []  Stop breathing  []  Palpitations       []  Other:     Significant Co-morbidities:  []  Congestive Heart Failure     []  COPD         []  Stroke (Past 30 Days)      []  Supplemental Oxygen Usage []  Cognitive Impairment      []  Neuromuscular Problems  []  Epilepsy/Neurological Disorders             Social History     Socioeconomic History    Marital status:      Spouse name: Not on file    Number of children: Not on file    Years of education: Not on file    Highest education level: Not on file   Occupational History    Not on file   Social Needs    Financial resource strain: Not on file    Food insecurity     Worry: Not on file     Inability: Not on file    Transportation needs     Medical: Not on file     Non-medical: Not on file   Tobacco Use    Smoking status: Never Smoker    Smokeless tobacco: Never Used   Substance and Sexual Activity    Alcohol use: No    Drug use: No    Sexual activity: Not on file   Lifestyle    Physical activity     Days per week: Not on file     Minutes per session: Not on file    Stress: Not on file   Relationships    Social connections     Talks on phone: Not on file     Gets together: Not on file     Attends Judaism service: Not on file     Active member of club or organization: Not on file     Attends meetings of clubs or organizations: Not on file     Relationship status: Not on file    Intimate partner violence     Fear of current or ex partner: Not on file     Emotionally abused: Not on file     Physically abused: Not on file     Forced sexual activity: Not on file   Other Topics Concern    Not on file   Social History Narrative    Not on file       Prior to Admission medications    Medication Sig Start Date End Date Taking?  Authorizing Provider   escitalopram (LEXAPRO) 10 MG tablet Take 10 mg by mouth daily   Yes Historical Provider, MD   Polysaccharide Iron Complex (PROFE PO) Take 180 mg by mouth   Yes Historical Provider, MD   melatonin 10 MG CAPS capsule Take 10 mg by mouth nightly   Yes Historical Provider, MD   Ascorbic Acid (VITAMIN C) 500 MG CAPS Take by mouth   Yes Historical Provider, MD   traZODone (DESYREL) 50 MG tablet Take 50 mg by mouth nightly   Yes Historical Provider, MD   Biotin 1000 MCG TABS Take 1 mg by mouth   Yes Historical Provider, MD   Cyanocobalamin (VITAMIN B-12) 2500 MCG SUBL Place 1 each under the tongue   Yes Historical Provider, MD   naproxen (NAPROSYN) 500 MG tablet Take 1 tablet by mouth 2 times daily 5/20/18  Yes Sujatha Wynn,    zinc gluconate 50 MG tablet Take 50 mg by mouth daily   Yes Historical Provider, MD   Multiple Vitamins-Minerals (ALIVE WOMENS GUMMY) CHEW Take 2 tablets by mouth daily   Yes Historical Provider, MD   B Complex Vitamins (VITAMIN B COMPLEX PO) Take by mouth   Yes Historical Provider, MD   potassium chloride (KLOR-CON) 20 MEQ packet Take 40 mEq by mouth daily. Yes Historical Provider, MD   fluticasone-salmeterol (ADVAIR) 250-50 MCG/DOSE AEPB Inhale 1 puff into the lungs every 12 hours. Yes Historical Provider, MD   vitamin D (ERGOCALCIFEROL) 45921 UNITS CAPS capsule Take 10,000 Units by mouth Twice a Week    Yes Historical Provider, MD   Clotrimazole (ALEVAZOL) 1 % OINT Apply topically 12/9/16   Historical Provider, MD   fluticasone (FLONASE) 50 MCG/ACT nasal spray 1 spray by Nasal route daily as needed 2/18/16   Historical Provider, MD   calcium carbonate (OSCAL) 500 MG TABS tablet Take 2 tablets by mouth    Historical Provider, MD   magnesium oxide (MAG-OX) 400 MG tablet Take 400 mg by mouth daily    Historical Provider, MD   calcium carbonate (OSCAL) 500 MG TABS tablet Take 500 mg by mouth daily    Historical Provider, MD   Rivaroxaban (XARELTO PO) Take 20 mg by mouth daily.     Historical Provider, MD       Allergies as of 04/01/2021 - Review Complete 04/01/2021   Allergen Reaction Noted    Dye [iodides]  03/21/2012    Invokana [canagliflozin] Other (See Comments) 09/18/2015    Liraglutide  04/29/2013    Adhesive tape Dermatitis 01/04/2018       Patient Active Problem List   Diagnosis    CKD (chronic kidney disease) stage 3, GFR 30-59 ml/min    Diabetes mellitus ([facility]    DVT (deep venous thrombosis) (HCC)    Hypertension    Hip pain    Obesity (BMI 30.0-34. 9)    MARQUISE (obstructive sleep apnea)    Cardiomegaly    Chronic embolism and thrombosis of unspecified deep veins of unspecified distal lower extremity (HCC)    Bariatric surgery status    Iron deficiency anemia secondary to blood loss (chronic)    Other nonspecific abnormal finding of lung field    Vitamin D deficiency, unspecified    Hypersomnia       Past Medical History:   Diagnosis Date    Acid reflux     Arthritis     Asthma     CH[facility]congestive heart failure) ([facility]     Patient dneies - states she does not have CHF.  CKD (chronic kidney disease) stage 3, GFR 30-59 ml/min     Diabetes mellitus ([facility])     DVT (deep venous thrombosis) ([facility] [address]     Hypertension     Sleep apnea        Past Surgical History:   Procedure Laterality Date    APPENDECTOMY      BACK SURGERY      neck x4    CHOLECYSTECTOMY      COLONOSCOPY      DILATION AND CURETTAGE OF UTERUS  x2    EYE SURGERY      bilateral cataracts    HYSTERECTOMY      KERATOPLASTY      KNEE SURGERY      both legs    AYAN-EN-Y GASTRIC BYPASS      TONSILLECTOMY         No family history on file. Objective:   Ht 5' 2\" (1.575 m)   Wt 197 lb (89.4 kg)   BMI 36.03 kg/m²   Body mass index is 36.03 kg/m².   Sleep Medicine 4/1/2021 6/29/2018 6/14/2018 6/14/2018   Sitting and reading 3 2 2 -   Watching TV 2 2 1 -   Sitting, inactive in a public place (e.g. a theatre or a meeting) 0 2 1 -   As a passenger in a car for an hour without a break 2 0 2 -   Lying down to rest in the afternoon when circumstances permit 0 2 3 -   Sitting and talking to someone 2 2 1 -   Sitting quietly after a lunch without alcohol 2 1 1 -   In a car, while stopped for a few minutes in traffic 0 0 0 -   Total score 11 11 11 -   Neck circumference - - 13 13       Vitals:    04/01/21 1103   Weight: 197 lb (89.4 kg)   Height: 5' 2\" (1.575 m)        Inches  [place] - Total score: 11    Gen: No distress. Eyes: PERRL. No sclera icterus. No conjunctival injection. ENT: No discharge. Pharynx clear. External appearance of ears and nose normal.  Neck: Trachea midline. No obvious mass. Resp: No accessory muscle use. No crackles. No wheezes. No rhonchi. No dullness on percussion. CV: Regular rate. Regular rhythm. No murmur or rub. No edema. GI: Non-tender. Non-distended. No hernia. Skin: Warm, dry, normal texture and turgor. No nodule on exposed extremities. Lymph: No cervical LAD. No supraclavicular LAD. M/S: No cyanosis. No clubbing. No joint deformity. Psych: Oriented x 3. No anxiety. Awake. Alert. Intact judgement and insight. Mallampati Airway Classification:   []1 []2 [x]3 []4        Assessment and Plan     Diagnosis:    Problem List        Pulmonary Problems    MARQUISE (obstructive sleep apnea)     She has gained weight and has EDS  Advised to go for the sleep study  Loose weight         Relevant Orders    Baseline Diagnostic Sleep Study       Other    Obesity (BMI 30.0-34. 9)     Advised to loose weight with diet and exercise           Hypersomnia     Advised to go for the sleep study  Loose weight                     Additional Plan:     [x]  Sleep hygiene/ relaxation methods & CBTi principles review with patient   [x]  Avoid supine/back sleep until sleep study   [x]  Driving precautions   [x]  Medical consequences of untreated MARQUISE   [x]  Weight loss recommendations   [x]  Diet recommendations   [x]  Exercise   []  Advised to quit smoking       []  PFT referral   []  Bariatric Program referral      Follow-Up:    No follow-ups on file. Meli Modi is a 77 y.o. female being evaluated by a Virtual Visit (video visit) encounter to address concerns as mentioned above. A caregiver was present when appropriate.  Due to this being a TeleHealth encounter (During VZLS-25 public health emergency), evaluation of the following organ systems was limited: Vitals/Constitutional/EENT/Resp/CV/GI//MS/Neuro/Skin/Heme-Lymph-Imm. Pursuant to the emergency declaration under the [address] [place] [address] and the [employer] Act, this Virtual Visit was conducted with patient's (and/or legal guardian's) consent, to reduce the patient's risk of exposure to COVID-19 and provide necessary medical care. The patient (and/or legal guardian) has also been advised to contact this office for worsening conditions or problems, and seek emergency medical treatment and/or call 911 if deemed necessary. Patient identification was verified at the start of the visit: Yes    Total time spent for this encounter: 21 minutes    Services were provided through a video synchronous discussion virtually to substitute for in-person clinic visit. Patient and provider were located at their individual homes. --Junior Ruby MD on 4/1/2021 at 11:15 AM    An electronic signature was used to authenticate this note.      Electronically signed by Junior Ruby MD on 4/1/2021 at 11:15 AM

## 2021-04-19 ENCOUNTER — HOSPITAL ENCOUNTER (OUTPATIENT)
Dept: SLEEP CENTER | Age: 66
Discharge: HOME | End: 2021-04-19
Payer: COMMERCIAL

## 2021-04-19 VITALS — WEIGHT: 197 LBS | HEIGHT: 62 IN | BODY MASS INDEX: 36.25 KG/M2

## 2021-04-19 DIAGNOSIS — G47.33 OSA (OBSTRUCTIVE SLEEP APNEA): ICD-10-CM

## 2021-04-19 PROCEDURE — 95810 POLYSOM 6/> YRS 4/> PARAM: CPT

## 2021-04-19 ASSESSMENT — SLEEP AND FATIGUE QUESTIONNAIRES: ESS TOTAL SCORE: 11

## 2021-04-20 NOTE — PROGRESS NOTES
4/19/2021  sleep study  for Dimple Rolon  1955 is complete. Results are pending physician review.     Electronically signed by Norberto Newberry on 4/19/2021 at 10:38 PM

## 2021-04-21 LAB — STATUS: NORMAL

## 2021-04-21 PROCEDURE — 95810 POLYSOM 6/> YRS 4/> PARAM: CPT | Performed by: INTERNAL MEDICINE

## 2021-05-20 ENCOUNTER — HOSPITAL ENCOUNTER (OUTPATIENT)
Dept: SLEEP CENTER | Age: 66
Discharge: HOME | End: 2021-05-20
Payer: COMMERCIAL

## 2021-05-20 DIAGNOSIS — E66.9 OBESITY (BMI 30.0-34.9): ICD-10-CM

## 2021-05-20 DIAGNOSIS — G47.33 OSA (OBSTRUCTIVE SLEEP APNEA): ICD-10-CM

## 2021-05-20 DIAGNOSIS — G47.10 HYPERSOMNIA: ICD-10-CM

## 2021-05-20 PROCEDURE — 9990000010 HC NO CHARGE VISIT

## 2021-05-20 PROCEDURE — 99213 OFFICE O/P EST LOW 20 MIN: CPT | Performed by: INTERNAL MEDICINE

## 2021-05-20 ASSESSMENT — ENCOUNTER SYMPTOMS
SHORTNESS OF BREATH: 0
COUGH: 0
EYE DISCHARGE: 0
EYE ITCHING: 0
ABDOMINAL PAIN: 0
ABDOMINAL DISTENTION: 0

## 2021-05-20 NOTE — PROGRESS NOTES
Adeline Moses  1955  Referring Provider: Dr. Lemuel Miranda    Subjective:     Chief Complaint   Patient presents with    Sleep Apnea     G47.33    1 Month Follow-Up       HPI  Fariba Spring is a 77 y.o. female is doing a telephone follow up visit. She had h/o MARQUISE on CPAP then had gastric bypass surgery and lost about 110 lbs. So a repeat study done on 04/19.21 showed that she has MARQUISE with an AHI of 6.9 and desat to 82%. She has gained about 40 lbs since her last visit. She is still sleepy and tired during the day time. Current Outpatient Medications   Medication Sig Dispense Refill    escitalopram (LEXAPRO) 10 MG tablet Take 10 mg by mouth daily      Polysaccharide Iron Complex (PROFE PO) Take 180 mg by mouth      melatonin 10 MG CAPS capsule Take 10 mg by mouth nightly      Ascorbic Acid (VITAMIN C) 500 MG CAPS Take by mouth      traZODone (DESYREL) 50 MG tablet Take 50 mg by mouth nightly      Clotrimazole (ALEVAZOL) 1 % OINT Apply topically      Biotin 1000 MCG TABS Take 1 mg by mouth      fluticasone (FLONASE) 50 MCG/ACT nasal spray 1 spray by Nasal route daily as needed      calcium carbonate (OSCAL) 500 MG TABS tablet Take 2 tablets by mouth      Cyanocobalamin (VITAMIN B-12) 2500 MCG SUBL Place 1 each under the tongue      magnesium oxide (MAG-OX) 400 MG tablet Take 400 mg by mouth daily      naproxen (NAPROSYN) 500 MG tablet Take 1 tablet by mouth 2 times daily 10 tablet 0    zinc gluconate 50 MG tablet Take 50 mg by mouth daily      Multiple Vitamins-Minerals (ALIVE WOMENS GUMMY) CHEW Take 2 tablets by mouth daily      calcium carbonate (OSCAL) 500 MG TABS tablet Take 500 mg by mouth daily      B Complex Vitamins (VITAMIN B COMPLEX PO) Take by mouth      potassium chloride (KLOR-CON) 20 MEQ packet Take 40 mEq by mouth daily.  fluticasone-salmeterol (ADVAIR) 250-50 MCG/DOSE AEPB Inhale 1 puff into the lungs every 12 hours.  Rivaroxaban (XARELTO PO) Take 20 mg by mouth daily.  vitamin D (ERGOCALCIFEROL) 57617 UNITS CAPS capsule Take 10,000 Units by mouth Twice a Week        No current facility-administered medications for this encounter. Allergies   Allergen Reactions    Dye [Iodides]     Invokana [Canagliflozin] Other (See Comments)    Liraglutide     Adhesive Tape Dermatitis     Tears her skin, skin red and irritated       Past Medical History:   Diagnosis Date    Acid reflux     Arthritis     Asthma     [facility]     Patient dneies - states she does not have CHF.  CKD (chronic kidney disease) stage 3, GFR 30-59 ml/min     Diabetes mellitus (HCC)     [facility]) ([facility]     Hypertension     Sleep apnea        Past Surgical History:   Procedure Laterality Date    APPENDECTOMY      BACK SURGERY      neck x4    CHOLECYSTECTOMY      COLONOSCOPY      DILATION AND CURETTAGE OF UTERUS  x2    EYE SURGERY      bilateral cataracts    HYSTERECTOMY      KERATOPLASTY      KNEE SURGERY      both legs    AYAN-EN-Y GASTRIC BYPASS      TONSILLECTOMY         Social History     Socioeconomic History    Marital status:      Spouse name: Not on file    Number of children: Not on file    Years of education: Not on file    Highest education level: Not on file   Occupational History    Not on file   Tobacco Use    Smoking status: Never Smoker    Smokeless tobacco: Never Used   Substance and Sexual Activity    Alcohol use: No    Drug use: No    Sexual activity: Not on file   Other Topics Concern    Not on file   Social History Narrative    Not on file     Social Determinants of Health     Financial Resource Strain:     Difficulty of Paying Living Expenses:    Food Insecurity:     Worried About Running Out of Food in the Last Year:     Ran Out of Food in the Last Year:    Transportation Needs:     Lack of Transportation (Medical):      Lack of Transportation (Non-Medical):    Physical Activity:     Days of Obesity (BMI 30.0-34. 9)     Advised to loose weight with diet and excercise         Hypersomnia     Advised to go for the CPAP titration study  Loose weight                    No follow-ups on file. Progress notes sent to the referring Provider    Umnag Soto is a 77 y.o. female being evaluated by a Virtual Visit (video visit) encounter to address concerns as mentioned above. A caregiver was present when appropriate. Due to this being a TeleHealth encounter (During [facility] emergency), evaluation of the following organ systems was limited: Vitals/Constitutional/EENT/Resp/CV/GI//MS/Neuro/Skin/Heme-Lymph-Imm. Pursuant to the emergency declaration under the [address] [place] [address], [address] [place] [address] authority and the [employer] Act, this Virtual Visit was conducted with patient's (and/or legal guardian's) consent, to reduce the patient's risk of exposure to COVID-19 and provide necessary medical care. The patient (and/or legal guardian) has also been advised to contact this office for worsening conditions or problems, and seek emergency medical treatment and/or call 911 if deemed necessary. Patient identification was verified at the start of the visit: Yes    Total time spent for this encounter: 21 minutes    Services were provided through a video synchronous discussion virtually to substitute for in-person clinic visit. Patient and provider were located at their individual homes. --Ramila Zepeda MD on 5/20/2021 at 10:37 AM    An electronic signature was used to authenticate this note.      Ramila Zepeda MD MD  5/20/2021  10:37 AM

## 2021-05-29 ENCOUNTER — HOSPITAL ENCOUNTER (EMERGENCY)
Age: 66
Discharge: HOME | End: 2021-05-29
Payer: COMMERCIAL

## 2021-05-29 ENCOUNTER — APPOINTMENT (OUTPATIENT)
Dept: CT IMAGING | Age: 66
End: 2021-05-29
Payer: COMMERCIAL

## 2021-05-29 VITALS
OXYGEN SATURATION: 97 % | RESPIRATION RATE: 19 BRPM | DIASTOLIC BLOOD PRESSURE: 61 MMHG | HEIGHT: 62 IN | WEIGHT: 197 LBS | BODY MASS INDEX: 36.25 KG/M2 | SYSTOLIC BLOOD PRESSURE: 156 MMHG | HEART RATE: 57 BPM | TEMPERATURE: 98 F

## 2021-05-29 DIAGNOSIS — S09.90XA INJURY OF HEAD, INITIAL ENCOUNTER: Primary | ICD-10-CM

## 2021-05-29 PROCEDURE — 72125 CT NECK SPINE W/O DYE: CPT

## 2021-05-29 PROCEDURE — 6370000000 HC RX 637 (ALT 250 FOR IP): Performed by: PHYSICIAN ASSISTANT

## 2021-05-29 PROCEDURE — 70450 CT HEAD/BRAIN W/O DYE: CPT

## 2021-05-29 PROCEDURE — 99283 EMERGENCY DEPT VISIT LOW MDM: CPT

## 2021-05-29 RX ORDER — ACETAMINOPHEN 500 MG
1000 TABLET ORAL ONCE
Status: COMPLETED | OUTPATIENT
Start: 2021-05-29 | End: 2021-05-29

## 2021-05-29 RX ADMIN — ACETAMINOPHEN 1000 MG: 500 TABLET, FILM COATED ORAL at 17:22

## 2021-05-29 ASSESSMENT — PAIN SCALES - GENERAL: PAINLEVEL_OUTOF10: 6

## 2021-05-29 NOTE — ED PROVIDER NOTES
As provider-in-triage, I performed a medical screening history and physical exam on this patient. HISTORY OF PRESENT ILLNESS  Melissa Morales is a 77 y.o. female who presents for head injury, states was leaning forward and lost balance, struck forehead on keyboard. Notes swelling over forehead. No LOC, vomiting. Not on thinners. PHYSICAL EXAM  BP (!) 156/61   Pulse 57   Temp 98 °F (36.7 °C) (Oral)   Resp 19   Ht 5' 2\" (1.575 m)   Wt 197 lb (89.4 kg)   SpO2 97%   BMI 36.03 kg/m²     On exam, the patient appears well-hydrated, well-nourished, and in no acute distress. Mucous membranes are moist. Speech is clear. Breathing is unlabored. Skin is dry. Mental status is normal. Moves all extremities, and is without facial droop. Comment: Please note this report has been produced using speech recognition software and may contain errors related to that system including errors in grammar, punctuation, and spelling, as well as words and phrases that may be inappropriate. If there are any questions or concerns please feel free to contact the dictating provider for clarification.        [address], [place]-  05/29/21 8725

## 2021-05-29 NOTE — ED PROVIDER NOTES
[facility] COMPLAINT    Chief Complaint   Patient presents with    Head Injury       This patient was not evaluated by the attending physician. I have independently evaluated this patient. HPI    Sindi Church is a 77 y.o. female who presents with a head injury with an onset prior to arrival.   The context (mechanism) was patient states she bent over to pick something up off the floor and hit her head against keyboard. Patient denies taking blood thinners or aspirin. There was no associated loss of consciousness. Patient states she initially had neck pain which is since improved. Patient states she has broken her neck 2 times previously. Denies blood or clear fluid from ears, nose, mouth. Denies vomiting. REVIEW OF SYSTEMS    Constitutional:  Denies fever, chills  Neurologic:  See HPI. Denies LOC. Denies extremity pain, sensory changes, or weakness. Eyes:  Denies dipplopia, blurred vision, or loss visual field. Denies discharge. Ears: no ear trauma or hearing changes  Musculoskeletal:  See HPI. No upper or lower extremity injuries. Cardiac: No Chest Pain, palpitations, or Chest Injury  Respiratory:  Denies cough, shortness of breath, respiratory discomfort   GI: No vomiting. No Abdominal pain or Abdominal Injury  : No Dysuria or Hematuria   Skin:  Skin intact. Denies rash     All other review of systems are negative  See HPI and nursing notes for additional information         PAST MEDICAL & SURGICAL HISTORY    Past Medical History:   Diagnosis Date    Acid reflux     Arthritis     Asthma     CHF (congestive heart failure) ([address]     Patient dneies - states she does not have CHF.     CKD (chronic kidney disease) stage 3, GFR 30-59 ml/min     Diabetes mellitus ([address]     DVT (deep venous thrombosis) ([address]     Hypertension     Sleep apnea      Past Surgical History:   Procedure Laterality Date    APPENDECTOMY      BACK SURGERY      neck x4    CHOLECYSTECTOMY      COLONOSCOPY      DILATION AND CURETTAGE OF UTERUS  x2    EYE SURGERY      bilateral cataracts    HYSTERECTOMY      KERATOPLASTY      KNEE SURGERY      both legs    AYAN-EN-Y GASTRIC BYPASS      TONSILLECTOMY         CURRENT MEDICATIONS    Current Outpatient Rx   Medication Sig Dispense Refill    escitalopram (LEXAPRO) 10 MG tablet Take 10 mg by mouth daily      Polysaccharide Iron Complex (PROFE PO) Take 180 mg by mouth      melatonin 10 MG CAPS capsule Take 10 mg by mouth nightly      Ascorbic Acid (VITAMIN C) 500 MG CAPS Take by mouth      traZODone (DESYREL) 50 MG tablet Take 50 mg by mouth nightly      Clotrimazole (ALEVAZOL) 1 % OINT Apply topically      Biotin 1000 MCG TABS Take 1 mg by mouth      fluticasone (FLONASE) 50 MCG/ACT nasal spray 1 spray by Nasal route daily as needed      calcium carbonate (OSCAL) 500 MG TABS tablet Take 2 tablets by mouth      Cyanocobalamin (VITAMIN B-12) 2500 MCG SUBL Place 1 each under the tongue      magnesium oxide (MAG-OX) 400 MG tablet Take 400 mg by mouth daily      naproxen (NAPROSYN) 500 MG tablet Take 1 tablet by mouth 2 times daily 10 tablet 0    zinc gluconate 50 MG tablet Take 50 mg by mouth daily      Multiple Vitamins-Minerals (ALIVE WOMENS GUMMY) CHEW Take 2 tablets by mouth daily      calcium carbonate (OSCAL) 500 MG TABS tablet Take 500 mg by mouth daily      B Complex Vitamins (VITAMIN B COMPLEX PO) Take by mouth      potassium chloride (KLOR-CON) 20 MEQ packet Take 40 mEq by mouth daily.  fluticasone-salmeterol (ADVAIR) 250-50 MCG/DOSE AEPB Inhale 1 puff into the lungs every 12 hours.  Rivaroxaban (XARELTO PO) Take 20 mg by mouth daily.       vitamin D (ERGOCALCIFEROL) 16552 UNITS CAPS capsule Take 10,000 Units by mouth Twice a Week          ALLERGIES    Allergies   Allergen Reactions    Dye [Iodides]     Invokana [Canagliflozin] Other (See Comments)    Liraglutide     Adhesive Tape Dermatitis Tears her skin, skin red and irritated       SOCIAL & FAMILY HISTORY    Social History     Socioeconomic History    Marital status:      Spouse name: None    Number of children: None    Years of education: None    Highest education level: None   Occupational History    None   Tobacco Use    Smoking status: Never Smoker    Smokeless tobacco: Never Used   Substance and Sexual Activity    Alcohol use: No    Drug use: No    Sexual activity: None   Other Topics Concern    None   Social History Narrative    None     Social Determinants of Health     Financial Resource Strain:     Difficulty of Paying Living Expenses:    Food Insecurity:     Worried About Running Out of Food in the Last Year:     Ran Out of Food in the Last Year:    Transportation Needs:     Lack of Transportation (Medical):  Lack of Transportation (Non-Medical):    Physical Activity:     Days of Exercise per Week:     Minutes of Exercise per Session:    Stress:     Feeling of Stress :    Social Connections:     Frequency of Communication with Friends and Family:     Frequency of Social Gatherings with Friends and Family:     Attends Yarsani Services:     Active Member of Clubs or Organizations:     Attends Club or Organization Meetings:     Marital Status:    Intimate Partner Violence:     Fear of Current or Ex-Partner:     Emotionally Abused:     Physically Abused:     Sexually Abused:      History reviewed. No pertinent family history. PHYSICAL EXAM    VITAL SIGNS: BP (!) 156/61   Pulse 57   Temp 98 °F (36.7 °C) (Oral)   Resp 19   Ht 5' 2\" (1.575 m)   Wt 197 lb (89.4 kg)   SpO2 97%   BMI 36.03 kg/m²    Constitutional:  Well developed, well nourished, no acute distress   Scalp: Frontal hematoma without palpable defect. Skin intact  Neck:   No JVD    No swelling or discoloration on inspection. No posterior midline neck tenderness. No pain or deficit on range of motion. Eyes: PERRL. EOMI.    HENT: No trismus, airway patent. Oropharynx clear, dentition intact   No Ramirez sign,  no raccoon sign. Respiratory:  Lungs Clear, no retractions   Cardiovascular:  Reg rate, no murmurs  GI:  Soft, nontender, normal bowel sounds  Musculoskeletal:  No edema, no acute deformities  Integument:  Well hydrated, no petechiae   Neurologic:    - Alert & oriented person, place, time, and situation, no speech difficulties or slurring.  - No obvious gross motor deficits  - Cranial nerves 2-12 grossly intact  - Sensation intact to light touch  - Strength 5/5 in upper and lower extremities bilaterally  - Normal finger to nose test bilaterally  - Rapid alternating movements intact  - No pronator drift. - Light touch sensation intact throughout.  - Unable to elicit DTRs due to body habitus  Psych: Pleasant affect, no hallucinations      IMAGING:  CT CERVICAL SPINE WO CONTRAST   Final Result   Previous ACDF of C5-6 and C6-7 which is unchanged. Mild degenerative disc changes throughout which is unchanged with no obvious   large disc bulge or herniation. Moderate osteoarthritic changes of the facets throughout which are otherwise   intact and unchanged. Previous suboccipital craniectomy which is unchanged. CT HEAD WO CONTRAST   Final Result   1. No acute intracranial abnormality nor acute calvarial fracture. 2. Small subgaleal hematoma and overlying subcutaneous contusion in the left   paramedian forehead. ED COURSE & MEDICAL DECISION MAKING     Patient presents as above. Patient provided Tylenol. CT head shows no acute intracranial abnormality, small subgaleal hematoma. CT cervical spine shows unchanged previous surgeries with no acute osseous abnormality. I discussed imaging results with patient today. Head injury instructions provided. Recommend follow-up with primary care provider in 2 days for recheck. Clinical  IMPRESSION    1.  Injury of head, initial encounter          Diagnosis and plan discussed in detail with patient who understands and agrees. Return to emergency Department precautions, which included any change in nature or severity of symptoms, development of numbness/tingling, or weakness, or any new symptoms, were discussed in detail with patient who understands and agrees. Comment: Please note this report has been produced using speech recognition software and may contain errors related to that system including errors in grammar, punctuation, and spelling, as well as words and phrases that may be inappropriate. If there are any questions or concerns please feel free to contact the dictating provider for clarification.               Gume Farley PA-C  05/29/21 2649

## 2021-05-31 ENCOUNTER — HOSPITAL ENCOUNTER (EMERGENCY)
Age: 66
Discharge: HOME | End: 2021-05-31
Attending: EMERGENCY MEDICINE
Payer: COMMERCIAL

## 2021-05-31 VITALS
BODY MASS INDEX: 36.76 KG/M2 | RESPIRATION RATE: 18 BRPM | OXYGEN SATURATION: 97 % | TEMPERATURE: 97.6 F | DIASTOLIC BLOOD PRESSURE: 68 MMHG | HEART RATE: 57 BPM | SYSTOLIC BLOOD PRESSURE: 156 MMHG | WEIGHT: 201 LBS

## 2021-05-31 DIAGNOSIS — H11.421 CONJUNCTIVAL EDEMA OF RIGHT EYE: Primary | ICD-10-CM

## 2021-05-31 DIAGNOSIS — H57.89 IRRITATION OF RIGHT EYE: ICD-10-CM

## 2021-05-31 PROCEDURE — 6370000000 HC RX 637 (ALT 250 FOR IP): Performed by: EMERGENCY MEDICINE

## 2021-05-31 PROCEDURE — 99284 EMERGENCY DEPT VISIT MOD MDM: CPT

## 2021-05-31 RX ORDER — NAPHAZOLINE HYDROCHLORIDE AND PHENIRAMINE MALEATE .25; 3 MG/ML; MG/ML
1 SOLUTION/ DROPS OPHTHALMIC 4 TIMES DAILY
Qty: 1 BOTTLE | Refills: 0 | Status: SHIPPED | OUTPATIENT
Start: 2021-05-31

## 2021-05-31 RX ORDER — CETIRIZINE HYDROCHLORIDE 10 MG/1
10 TABLET ORAL DAILY
Status: DISCONTINUED | OUTPATIENT
Start: 2021-05-31 | End: 2021-05-31 | Stop reason: HOSPADM

## 2021-05-31 RX ORDER — SOFT LENS RINSE,STORE SOLUTION
1 SOLUTION, NON-ORAL MISCELLANEOUS ONCE
Status: COMPLETED | OUTPATIENT
Start: 2021-05-31 | End: 2021-05-31

## 2021-05-31 RX ORDER — TETRACAINE HYDROCHLORIDE 5 MG/ML
2 SOLUTION OPHTHALMIC ONCE
Status: COMPLETED | OUTPATIENT
Start: 2021-05-31 | End: 2021-05-31

## 2021-05-31 RX ORDER — GENTAMICIN SULFATE 3 MG/ML
1 SOLUTION/ DROPS OPHTHALMIC 4 TIMES DAILY
Status: DISCONTINUED | OUTPATIENT
Start: 2021-05-31 | End: 2021-05-31 | Stop reason: HOSPADM

## 2021-05-31 RX ADMIN — FLUORESCEIN SODIUM 1 MG: 1 STRIP OPHTHALMIC at 18:19

## 2021-05-31 RX ADMIN — CETIRIZINE HYDROCHLORIDE 10 MG: 10 TABLET, FILM COATED ORAL at 19:20

## 2021-05-31 RX ADMIN — WATER, PURIFIED 1 DROP: 99.05 LIQUID OPHTHALMIC at 18:19

## 2021-05-31 RX ADMIN — GENTAMICIN SULFATE 1 DROP: 3 SOLUTION OPHTHALMIC at 19:20

## 2021-05-31 RX ADMIN — TETRACAINE HYDROCHLORIDE 2 DROP: 5 SOLUTION OPHTHALMIC at 18:19

## 2021-05-31 ASSESSMENT — PAIN DESCRIPTION - LOCATION: LOCATION: EYE

## 2021-05-31 ASSESSMENT — PAIN DESCRIPTION - ORIENTATION: ORIENTATION: RIGHT

## 2021-05-31 ASSESSMENT — PAIN DESCRIPTION - PAIN TYPE: TYPE: ACUTE PAIN

## 2021-05-31 ASSESSMENT — PAIN SCALES - GENERAL: PAINLEVEL_OUTOF10: 3

## 2021-05-31 ASSESSMENT — PAIN DESCRIPTION - DESCRIPTORS: DESCRIPTORS: ACHING;ITCHING

## 2021-05-31 NOTE — ED PROVIDER NOTES
Emergency Department Encounter  Location: [place] [facility] [address] [place] [address]    Patient: Dimple Rolon  MRN: [id number]  : 1955  Date of evaluation: 2021  ED Provider: Jerilyn Perez DO, FACEP    Chief Complaint:    Eye Injury (pt thinks a bug flew into eye. pt's rt eye swollen vision screening rt eye 20/20 lt eye 20/16 bilateral 20/16)    [place]:  Dimple Rolon is a 77 y.o. female that presents to the emergency department with complaints of irritation to her right eye. The patient states approximately 315 she was outside and felt something fly into her right eye. The patient states since that time she has had swelling of the right lateral portion of her eye. She states her vision is not affected. She describes her discomfort as 3 out of 10. She states the area continues to swell and has become very irritated. ROS:  At least 4 systems reviewed and otherwise acutely negative except as in the [address] Negative for fever or chills  Negative for chest pain  Negative for shortness of breath  Negative for nausea vomiting diarrhea or constipation    Past Medical History:   Diagnosis Date    Acid reflux     Arthritis     Asthma     [facility]     Patient dneies - states she does not have CHF.  CKD (chronic kidney disease) stage 3, GFR 30-59 ml/min     Diabetes mellitus ([address]     DVT (deep venous thrombosis) ([address]     Hypertension     Sleep apnea      Past Surgical History:   Procedure Laterality Date    APPENDECTOMY      BACK SURGERY      neck x4    CHOLECYSTECTOMY      COLONOSCOPY      DILATION AND CURETTAGE OF UTERUS  x2    EYE SURGERY      bilateral cataracts    HYSTERECTOMY      KERATOPLASTY      KNEE SURGERY      both legs    AYAN-EN-Y GASTRIC BYPASS      TONSILLECTOMY       History reviewed. No pertinent family history.   Social History     Socioeconomic History    Marital status:      Spouse name: Not on file    Number of children: Not on file    Years of education: Not on file    Highest education level: Not on file   Occupational History    Not on file   Tobacco Use    Smoking status: Never Smoker    Smokeless tobacco: Never Used   Substance and Sexual Activity    Alcohol use: No    Drug use: No    Sexual activity: Not on file   Other Topics Concern    Not on file   Social History Narrative    Not on file     Social Determinants of Health     Financial Resource Strain:     Difficulty of Paying Living Expenses:    Food Insecurity:     Worried About Running Out of Food in the Last Year:     [address] in the Last Year:    Transportation Needs:     Lack of Transportation (Medical):      Lack of Transportation (Non-Medical):    Physical Activity:     Days of Exercise per Week:     Minutes of Exercise per Session:    Stress:     Feeling of Stress :    Social Connections:     Frequency of Communication with Friends and Family:     Frequency of Social Gatherings with Friends and Family:     Attends Latter-day Services:     Active Member of Clubs or Organizations:     Attends Club or Organization Meetings:     Marital Status:    Intimate Partner Violence:     Fear of Current or Ex-Partner:     Emotionally Abused:     Physically Abused:     Sexually Abused:      Current Facility-Administered Medications   Medication Dose Route Frequency Provider Last Rate Last Admin    naphazoline-pheniramine (NAPHCON-A) 0.025-0.3 % ophthalmic solution 1 drop  1 drop Right Eye 4x Daily Timoteo Bond, DO        gentamicin (GARAMYCIN) 0.3 % ophthalmic solution 1 drop  1 drop Right Eye 4x Daily Timoteo Bond, DO         Current Outpatient Medications   Medication Sig Dispense Refill    escitalopram (LEXAPRO) 10 MG tablet Take 10 mg by mouth daily      Polysaccharide Iron Complex (PROFE PO) Take 180 mg by mouth      melatonin 10 MG CAPS capsule Take 10 mg by mouth nightly      Ascorbic Acid (VITAMIN C) 500 MG CAPS Take by mouth      traZODone (DESYREL) 50 MG tablet Take 50 mg by mouth nightly      Clotrimazole (ALEVAZOL) 1 % OINT Apply topically      Biotin 1000 MCG TABS Take 1 mg by mouth      fluticasone (FLONASE) 50 MCG/ACT nasal spray 1 spray by Nasal route daily as needed      calcium carbonate (OSCAL) 500 MG TABS tablet Take 2 tablets by mouth      Cyanocobalamin (VITAMIN B-12) 2500 MCG SUBL Place 1 each under the tongue      magnesium oxide (MAG-OX) 400 MG tablet Take 400 mg by mouth daily      naproxen (NAPROSYN) 500 MG tablet Take 1 tablet by mouth 2 times daily 10 tablet 0    zinc gluconate 50 MG tablet Take 50 mg by mouth daily      Multiple Vitamins-Minerals (ALIVE WOMENS GUMMY) CHEW Take 2 tablets by mouth daily      calcium carbonate (OSCAL) 500 MG TABS tablet Take 500 mg by mouth daily      B Complex Vitamins (VITAMIN B COMPLEX PO) Take by mouth      potassium chloride (KLOR-CON) 20 MEQ packet Take 40 mEq by mouth daily.  fluticasone-salmeterol (ADVAIR) 250-50 MCG/DOSE AEPB Inhale 1 puff into the lungs every 12 hours.  Rivaroxaban (XARELTO PO) Take 20 mg by mouth daily.  vitamin D (ERGOCALCIFEROL) 80260 UNITS CAPS capsule Take 10,000 Units by mouth Twice a Week        Allergies   Allergen Reactions    Dye [Iodides]     Invokana [Canagliflozin] Other (See Comments)    Liraglutide     Adhesive Tape Dermatitis     Tears her skin, skin red and irritated     Nursing Notes Reviewed    Physical Exam:  ED Triage Vitals [05/31/21 1751]   Enc Vitals Group      BP (!) 156/68      Pulse 57      Resp 18      Temp 97.6 °F (36.4 °C)      Temp Source Oral      SpO2 97 %      Weight 201 lb (91.2 kg)      Height       Head Circumference       Peak Flow       Pain Score       Pain Loc       Pain Edu? Excl. in [address]? GENERAL APPEARANCE: Awake and alert. Cooperative. No acute distress. Nontoxic in appearance  HEAD: Normocephalic. Atraumatic. EYES: Sclera anicteric.  Focused examination of the right eye reveals lids and lashes to appear normal. There is no exudate. There is no matting. The patient has extensive swelling of the bulbar conjunctive of the lateral portion of her right eye. Extraocular motions are intact and pupils are equal reactive to light bilaterally. Eversion of her right upper lid after administration of tetracaine drops reveals no evidence of retained foreign body under the upper lid. Slit lamp examination shows no obvious lacerations no obvious foreign body seen within the right eye. Fluorescein staining and slit lamp examination under blue light reveals diffuse uptake of fluorescein on the cornea but no obvious scratches and no obvious lacerations. ENT: Tolerates saliva. NECK: Supple. Trachea midline. LUNGS: Respirations unlabored. EXTREMITIES: No acute deformities. SKIN: Warm and dry. NEUROLOGICAL: No gross facial drooping. PSYCHIATRIC: Normal mood. Labs:  No results found for this visit on 05/31/21. Radiographs (if obtained):  [] The following radiograph was interpreted by myself in the absence of a radiologist:  [x] Radiologist's Report reviewed at time of ED visit:  No orders to display       ED Course and MDM:  Patient will be started on gentamicin and Naphcon-A. She is referred to her ophthalmologist Dr. Simone Obrien. She is encouraged to use cool wet compresses to her eye to help soothe the discomfort. She is instructed return for any problems or concerns and she will be discharged stable condition. Final Impression:  1. Conjunctival edema of right eye    2.  Irritation of right eye      DISPOSITION Discharge - Pending Orders Complete    Patient referred to:  Margie Cisse MD  [address] [facility]  [phone]    Schedule an appointment as soon as possible for a visit in 1 day  For follow up    Discharge medications:  New Prescriptions    No medications on file     (Please note that portions of this note may have been completed with a voice recognition program. Efforts were made to edit the dictations but occasionally words are mis-transcribed.)    Felix Cason DO, [facility]  Board certified in [address] [place] [address]        Felix Cason, [place]  05/31/21 AvSadia Haynes Principal [facility]

## 2021-06-21 ENCOUNTER — HOSPITAL ENCOUNTER (OUTPATIENT)
Dept: SLEEP CENTER | Age: 66
Discharge: HOME | End: 2021-06-21
Payer: COMMERCIAL

## 2021-06-21 DIAGNOSIS — G47.33 OSA (OBSTRUCTIVE SLEEP APNEA): ICD-10-CM

## 2021-06-21 PROCEDURE — 95811 POLYSOM 6/>YRS CPAP 4/> PARM: CPT

## 2021-06-21 PROCEDURE — 95811 POLYSOM 6/>YRS CPAP 4/> PARM: CPT | Performed by: INTERNAL MEDICINE

## 2021-06-23 LAB — STATUS: NORMAL

## 2021-09-02 ENCOUNTER — HOSPITAL ENCOUNTER (OUTPATIENT)
Dept: SLEEP CENTER | Age: 66
Discharge: HOME | End: 2021-09-02
Payer: COMMERCIAL

## 2021-09-02 DIAGNOSIS — G47.33 OSA (OBSTRUCTIVE SLEEP APNEA): ICD-10-CM

## 2021-09-02 DIAGNOSIS — E66.9 OBESITY (BMI 30.0-34.9): ICD-10-CM

## 2021-09-02 DIAGNOSIS — G47.10 HYPERSOMNIA: ICD-10-CM

## 2021-09-02 PROCEDURE — 9990000010 HC NO CHARGE VISIT

## 2021-09-02 PROCEDURE — 99214 OFFICE O/P EST MOD 30 MIN: CPT | Performed by: INTERNAL MEDICINE

## 2021-09-02 ASSESSMENT — ENCOUNTER SYMPTOMS
EYE DISCHARGE: 0
ABDOMINAL DISTENTION: 0
EYE ITCHING: 0
ABDOMINAL PAIN: 0
COUGH: 0
BACK PAIN: 0
SHORTNESS OF BREATH: 0

## 2021-09-02 NOTE — PROGRESS NOTES
Kev Martines  1955  Referring Provider: Bradley Puga    Subjective:     Chief Complaint   Patient presents with    Sleep Apnea     G47.33    3 Month Follow-Up       NIKKIE Garg is a 77 y.o. female has come back as a follow up. She has MARQUISE on a CPAP of 10 cmh20 which she is using it off and on for about 6/30 days about 3 hours per night. She has no loss of weight. Her 2 week download data showed that her residual AHI is 6.2 and leak is 54. She is still sleepy and tired during the day time. Current Outpatient Medications   Medication Sig Dispense Refill    naphazoline-pheniramine (NAPHCON-A) 0.025-0.3 % ophthalmic solution Place 1 drop into the right eye 4 times daily 1 Bottle 0    escitalopram (LEXAPRO) 10 MG tablet Take 10 mg by mouth daily      Polysaccharide Iron Complex (PROFE PO) Take 180 mg by mouth      melatonin 10 MG CAPS capsule Take 10 mg by mouth nightly      Ascorbic Acid (VITAMIN C) 500 MG CAPS Take by mouth      traZODone (DESYREL) 50 MG tablet Take 50 mg by mouth nightly      Clotrimazole (ALEVAZOL) 1 % OINT Apply topically      Biotin 1000 MCG TABS Take 1 mg by mouth      fluticasone (FLONASE) 50 MCG/ACT nasal spray 1 spray by Nasal route daily as needed      calcium carbonate (OSCAL) 500 MG TABS tablet Take 2 tablets by mouth      Cyanocobalamin (VITAMIN B-12) 2500 MCG SUBL Place 1 each under the tongue      magnesium oxide (MAG-OX) 400 MG tablet Take 400 mg by mouth daily      naproxen (NAPROSYN) 500 MG tablet Take 1 tablet by mouth 2 times daily 10 tablet 0    zinc gluconate 50 MG tablet Take 50 mg by mouth daily      Multiple Vitamins-Minerals (ALIVE WOMENS GUMMY) CHEW Take 2 tablets by mouth daily      calcium carbonate (OSCAL) 500 MG TABS tablet Take 500 mg by mouth daily      B Complex Vitamins (VITAMIN B COMPLEX PO) Take by mouth      potassium chloride (KLOR-CON) 20 MEQ packet Take 40 mEq by mouth daily.       fluticasone-salmeterol (ADVAIR) 250-50 MCG/DOSE AEPB Inhale 1 puff into the lungs every 12 hours.  Rivaroxaban (XARELTO PO) Take 20 mg by mouth daily.  vitamin D (ERGOCALCIFEROL) 17562 UNITS CAPS capsule Take 10,000 Units by mouth Twice a Week        No current facility-administered medications for this encounter. Allergies   Allergen Reactions    Dye [Iodides]     Invokana [Canagliflozin] Other (See Comments)    Liraglutide     Adhesive Tape Dermatitis     Tears her skin, skin red and irritated       Past Medical History:   Diagnosis Date    Acid reflux     Arthritis     Asthma     [facility]F [facility]congestive [facility] failure) [facility]     Patient dneies - states she does not have CHF.     CKD (chronic kidney disease) stage 3, GFR 30-59 ml/min [facility]     Diabetes mellitus ([facility]     DVT [facility]deep venous thrombosis) ([facility])     Hypertension     Sleep apnea        Past Surgical History:   Procedure Laterality Date    APPENDECTOMY      BACK SURGERY      neck x4    CHOLECYSTECTOMY      COLONOSCOPY      DILATION AND CURETTAGE OF UTERUS  x2    EYE SURGERY      bilateral cataracts    HYSTERECTOMY      KERATOPLASTY      KNEE SURGERY      both legs    AYAN-EN-Y GASTRIC BYPASS      TONSILLECTOMY         Social History     Socioeconomic History    Marital status:      Spouse name: Not on file    Number of children: Not on file    Years of education: Not on file    Highest education level: Not on file   Occupational History    Not on file   Tobacco Use    Smoking status: Never Smoker    Smokeless tobacco: Never Used   Substance and Sexual Activity    Alcohol use: No    Drug use: No    Sexual activity: Not on file   Other Topics Concern    Not on file   Social History Narrative    Not on file     Social Determinants of Health     Financial Resource Strain:     Difficulty of Paying Living Expenses:    Food Insecurity:     Worried About Running Out of Food in the Last Year:     Rainer of Food in the Last Year:    Transportation Needs:     Lack of Transportation (Medical):  Lack of Transportation (Non-Medical):    Physical Activity:     Days of Exercise per Week:     Minutes of Exercise per Session:    Stress:     Feeling of Stress :    Social Connections:     Frequency of Communication with Friends and Family:     Frequency of Social Gatherings with Friends and Family:     Attends Yarsani Services:     Active Member of Clubs or Organizations:     Attends Club or Organization Meetings:     Marital Status:    Intimate Partner Violence:     Fear of Current or Ex-Partner:     Emotionally Abused:     Physically Abused:     Sexually Abused:        Review of Systems   Constitutional: Positive for fatigue. HENT: Negative for congestion and postnasal drip. Eyes: Negative for discharge and itching. Respiratory: Negative for cough and shortness of breath. Cardiovascular: Negative for chest pain and leg swelling. Gastrointestinal: Negative for abdominal distention and abdominal pain. Endocrine: Negative for cold intolerance and heat intolerance. Genitourinary: Negative for enuresis and frequency. Musculoskeletal: Negative for arthralgias and back pain. Allergic/Immunologic: Negative for environmental allergies and food allergies. Neurological: Negative for light-headedness and headaches. Hematological: Negative for adenopathy. Psychiatric/Behavioral: Negative for agitation and behavioral problems. Objective: There were no vitals taken for this visit. There is no height or weight on file to calculate BMI.   Sleep Medicine 4/19/2021 4/1/2021 6/29/2018 6/14/2018 6/14/2018   Sitting and reading 3 3 2 2 -   Watching TV 2 2 2 1 -   Sitting, inactive in a public place (e.g. a theatre or a meeting) 0 0 2 1 -   As a passenger in a car for an hour without a break 2 2 0 2 -   Lying down to rest in the afternoon when circumstances permit 0 0 2 3 -   Sitting and talking to someone 2 2 2 1 -   Sitting quietly after a lunch without alcohol 2 2 1 1 -   In a car, while stopped for a few minutes in traffic 0 0 0 0 -   Total score 11 11 11 11 -   Neck circumference 13.5 - - 13 13     {MALLAMPATI:3    Physical Exam  Vitals reviewed. Constitutional:       Appearance: Normal appearance. Comments: Obesity   HENT:      Head: Normocephalic and atraumatic. Nose: Nose normal.      Mouth/Throat:      Mouth: Mucous membranes are moist.   Eyes:      Extraocular Movements: Extraocular movements intact. Pupils: Pupils are equal, round, and reactive to light. Cardiovascular:      Rate and Rhythm: Normal rate and regular rhythm. Pulses: Normal pulses. Heart sounds: Normal heart sounds. Pulmonary:      Effort: Pulmonary effort is normal.      Breath sounds: Normal breath sounds. Abdominal:      General: Abdomen is flat. Palpations: Abdomen is soft. Musculoskeletal:         General: Normal range of motion. Cervical back: Normal range of motion and neck supple. Skin:     General: Skin is warm and dry. Neurological:      General: No focal deficit present. Mental Status: She is alert and oriented to person, place, and time. Psychiatric:         Mood and Affect: Mood normal.         Behavior: Behavior normal.         Radiology: None    Assessment and Plan     Problem List        Pulmonary Problems    MARQUISE (obstructive sleep apnea)     Advised to be compliant with the CPAP  Loose weight  Mask fitting trial  2 week download data            Other    Obesity (BMI 30.0-34. 9)      Advised to loose weight with diet and exercise           Hypersomnia      Advised to be compliant with the CPAP  Loose weight  Need 2 week download data                    Follow-Up:    No follow-ups on file.      Progress notes sent to the referring Provider    Conor Batista MD MD  9/2/2021  11:19 AM

## 2021-10-11 ENCOUNTER — HOSPITAL ENCOUNTER (OUTPATIENT)
Age: 66
Discharge: HOME | End: 2021-10-11
Payer: COMMERCIAL

## 2021-10-11 LAB
ALBUMIN SERPL-MCNC: 3.8 GM/DL (ref 3.4–5)
ALP BLD-CCNC: 178 IU/L (ref 40–129)
ALT SERPL-CCNC: 16 U/L (ref 10–40)
ANION GAP SERPL CALCULATED.3IONS-SCNC: 9 MMOL/L (ref 4–16)
AST SERPL-CCNC: 19 IU/L (ref 15–37)
BASOPHILS ABSOLUTE: 0 K/CU MM
BASOPHILS RELATIVE PERCENT: 0.4 % (ref 0–1)
BILIRUB SERPL-MCNC: 0.3 MG/DL (ref 0–1)
BUN BLDV-MCNC: 23 MG/DL (ref 6–23)
CALCIUM SERPL-MCNC: 9.3 MG/DL (ref 8.3–10.6)
CHLORIDE BLD-SCNC: 106 MMOL/L (ref 99–110)
CO2: 27 MMOL/L (ref 21–32)
CREAT SERPL-MCNC: 1.1 MG/DL (ref 0.6–1.1)
DIFFERENTIAL TYPE: ABNORMAL
EOSINOPHILS ABSOLUTE: 0.1 K/CU MM
EOSINOPHILS RELATIVE PERCENT: 1.4 % (ref 0–3)
FERRITIN: 50 NG/ML (ref 15–150)
GFR AFRICAN AMERICAN: 60 ML/MIN/1.73M2
GFR NON-AFRICAN AMERICAN: 50 ML/MIN/1.73M2
GLUCOSE FASTING: 89 MG/DL (ref 70–99)
HBV SURFACE AB TITR SER: <3.5 {TITER}
HCT VFR BLD CALC: 40 % (ref 37–47)
HEMOGLOBIN: 13 GM/DL (ref 12.5–16)
HEPATITIS B SURFACE ANTIGEN: NON REACTIVE
HEPATITIS C ANTIBODY: NON REACTIVE
IGA: 149 MG/DL (ref 69–382)
IGG,SERUM: 852 MG/DL (ref 723–1685)
IGM,SERUM: 82 MG/DL (ref 62–277)
IMMATURE NEUTROPHIL %: 0.3 % (ref 0–0.43)
INR BLD: 0.84 INDEX
IRON: 56 UG/DL (ref 37–145)
LYMPHOCYTES ABSOLUTE: 2.3 K/CU MM
LYMPHOCYTES RELATIVE PERCENT: 28.8 % (ref 24–44)
MCH RBC QN AUTO: 29.5 PG (ref 27–31)
MCHC RBC AUTO-ENTMCNC: 32.5 % (ref 32–36)
MCV RBC AUTO: 90.7 FL (ref 78–100)
MONOCYTES ABSOLUTE: 0.7 K/CU MM
MONOCYTES RELATIVE PERCENT: 9 % (ref 0–4)
PCT TRANSFERRIN: 21 % (ref 10–44)
PDW BLD-RTO: 13.6 % (ref 11.7–14.9)
PLATELET # BLD: 216 K/CU MM (ref 140–440)
PMV BLD AUTO: 9.5 FL (ref 7.5–11.1)
POTASSIUM SERPL-SCNC: 4.5 MMOL/L (ref 3.5–5.1)
PROTHROMBIN TIME: 10.5 SECONDS (ref 11.7–14.5)
RBC # BLD: 4.41 M/CU MM (ref 4.2–5.4)
SEGMENTED NEUTROPHILS ABSOLUTE COUNT: 4.7 K/CU MM
SEGMENTED NEUTROPHILS RELATIVE PERCENT: 60.1 % (ref 36–66)
SODIUM BLD-SCNC: 142 MMOL/L (ref 135–145)
TOTAL IMMATURE NEUTOROPHIL: 0.02 K/CU MM
TOTAL IRON BINDING CAPACITY: 272 UG/DL (ref 250–450)
TOTAL PROTEIN: 6 GM/DL (ref 6.4–8.2)
UNSATURATED IRON BINDING CAPACITY: 216 UG/DL (ref 110–370)
WBC # BLD: 7.9 K/CU MM (ref 4–10.5)

## 2021-10-11 PROCEDURE — 85610 PROTHROMBIN TIME: CPT

## 2021-10-11 PROCEDURE — 85025 COMPLETE CBC W/AUTO DIFF WBC: CPT

## 2021-10-11 PROCEDURE — 82784 ASSAY IGA/IGD/IGG/IGM EACH: CPT

## 2021-10-11 PROCEDURE — 82390 ASSAY OF CERULOPLASMIN: CPT

## 2021-10-11 PROCEDURE — 86803 HEPATITIS C AB TEST: CPT

## 2021-10-11 PROCEDURE — 83516 IMMUNOASSAY NONANTIBODY: CPT

## 2021-10-11 PROCEDURE — 87340 HEPATITIS B SURFACE AG IA: CPT

## 2021-10-11 PROCEDURE — 86706 HEP B SURFACE ANTIBODY: CPT

## 2021-10-11 PROCEDURE — 36415 COLL VENOUS BLD VENIPUNCTURE: CPT

## 2021-10-11 PROCEDURE — 80053 COMPREHEN METABOLIC PANEL: CPT

## 2021-10-11 PROCEDURE — 83550 IRON BINDING TEST: CPT

## 2021-10-11 PROCEDURE — 82104 ALPHA-1-ANTITRYPSIN PHENO: CPT

## 2021-10-11 PROCEDURE — 86038 ANTINUCLEAR ANTIBODIES: CPT

## 2021-10-11 PROCEDURE — 82728 ASSAY OF FERRITIN: CPT

## 2021-10-11 PROCEDURE — 86256 FLUORESCENT ANTIBODY TITER: CPT

## 2021-10-11 PROCEDURE — 86708 HEPATITIS A ANTIBODY: CPT

## 2021-10-11 PROCEDURE — 83540 ASSAY OF IRON: CPT

## 2021-10-11 PROCEDURE — 82105 ALPHA-FETOPROTEIN SERUM: CPT

## 2021-10-14 LAB
ANTI-MITOCHON TITER: 2 UNITS (ref 0–24.9)
ANTI-NUCLEAR ANTIBODY (ANA): NORMAL
CERULOPLASMIN: 27 MG/DL (ref 17–54)
F-ACTIN AB, IGG: 5 UNITS (ref 0–19)
HAV AB SERPL IA-ACNC: NEGATIVE
MS ALPHA-FETOPROTEIN: 2 NG/ML (ref 0–9)

## 2021-10-15 LAB
ALPHA-1 ANTITRYPSIN PHENOTYPE: NORMAL
ALPHA-1 ANTITRYPSIN: 110 MG/DL (ref 90–200)

## 2021-11-12 ENCOUNTER — OFFICE VISIT (OUTPATIENT)
Dept: PULMONOLOGY | Age: 66
End: 2021-11-12
Payer: COMMERCIAL

## 2021-11-12 VITALS
BODY MASS INDEX: 33.66 KG/M2 | HEIGHT: 63 IN | SYSTOLIC BLOOD PRESSURE: 132 MMHG | WEIGHT: 190 LBS | OXYGEN SATURATION: 97 % | DIASTOLIC BLOOD PRESSURE: 64 MMHG | HEART RATE: 68 BPM

## 2021-11-12 DIAGNOSIS — G47.10 HYPERSOMNIA: ICD-10-CM

## 2021-11-12 DIAGNOSIS — G47.33 OSA (OBSTRUCTIVE SLEEP APNEA): ICD-10-CM

## 2021-11-12 DIAGNOSIS — E66.9 OBESITY (BMI 30.0-34.9): ICD-10-CM

## 2021-11-12 PROCEDURE — G8417 CALC BMI ABV UP PARAM F/U: HCPCS | Performed by: INTERNAL MEDICINE

## 2021-11-12 PROCEDURE — G8484 FLU IMMUNIZE NO ADMIN: HCPCS | Performed by: INTERNAL MEDICINE

## 2021-11-12 PROCEDURE — 99213 OFFICE O/P EST LOW 20 MIN: CPT | Performed by: INTERNAL MEDICINE

## 2021-11-12 PROCEDURE — G8427 DOCREV CUR MEDS BY ELIG CLIN: HCPCS | Performed by: INTERNAL MEDICINE

## 2021-11-12 PROCEDURE — 1090F PRES/ABSN URINE INCON ASSESS: CPT | Performed by: INTERNAL MEDICINE

## 2021-11-12 ASSESSMENT — ENCOUNTER SYMPTOMS
COUGH: 0
EYE DISCHARGE: 0
EYE ITCHING: 0
SHORTNESS OF BREATH: 0
ABDOMINAL DISTENTION: 0
BACK PAIN: 0
ABDOMINAL PAIN: 0

## 2021-11-12 NOTE — ASSESSMENT & PLAN NOTE
Patient is not able to tolerate the current CPAP as she has gained about 10 lbs   Advised to use Auto CPAP  Loose weight  Need 2 week download data

## 2021-11-12 NOTE — PROGRESS NOTES
Shelby Cardenas  1955  Referring Provider: Chrissy Howell MD    Subjective:     Chief Complaint   Patient presents with    Sleep Apnea     compliance        HPI  Erik Whitmore is a 74 y.o. female has come back as a follow up. She has MARQUISE on a CPAP of 10 cm h20 which she is not able to pam it as she is not able tolerate the CPAP pressure as she feels the pressure is less. She has a large leak. She has used 0/30 days for more than 4 hours (0%). Her 2 week download data showed residual AHI os 5.0 and leak is 52.2 l/min. She has gained about 10 lbs. She is still sleepy and tired during the day time.      Current Outpatient Medications   Medication Sig Dispense Refill    naphazoline-pheniramine (NAPHCON-A) 0.025-0.3 % ophthalmic solution Place 1 drop into the right eye 4 times daily 1 Bottle 0    escitalopram (LEXAPRO) 10 MG tablet Take 10 mg by mouth daily      Polysaccharide Iron Complex (PROFE PO) Take 180 mg by mouth      melatonin 10 MG CAPS capsule Take 10 mg by mouth nightly      Ascorbic Acid (VITAMIN C) 500 MG CAPS Take by mouth      traZODone (DESYREL) 50 MG tablet Take 50 mg by mouth nightly      Clotrimazole (ALEVAZOL) 1 % OINT Apply topically      Biotin 1000 MCG TABS Take 1 mg by mouth      fluticasone (FLONASE) 50 MCG/ACT nasal spray 1 spray by Nasal route daily as needed      calcium carbonate (OSCAL) 500 MG TABS tablet Take 2 tablets by mouth      Cyanocobalamin (VITAMIN B-12) 2500 MCG SUBL Place 1 each under the tongue      magnesium oxide (MAG-OX) 400 MG tablet Take 400 mg by mouth daily      naproxen (NAPROSYN) 500 MG tablet Take 1 tablet by mouth 2 times daily 10 tablet 0    zinc gluconate 50 MG tablet Take 50 mg by mouth daily      Multiple Vitamins-Minerals (ALIVE WOMENS GUMMY) CHEW Take 2 tablets by mouth daily      calcium carbonate (OSCAL) 500 MG TABS tablet Take 500 mg by mouth daily      B Complex Vitamins (VITAMIN B COMPLEX PO) Take by mouth      potassium chloride (KLOR-CON) 20 MEQ packet Take 40 mEq by mouth daily.  fluticasone-salmeterol (ADVAIR) 250-50 MCG/DOSE AEPB Inhale 1 puff into the lungs every 12 hours.  Rivaroxaban (XARELTO PO) Take 20 mg by mouth daily.  vitamin D (ERGOCALCIFEROL) 64200 UNITS CAPS capsule Take 10,000 Units by mouth Twice a Week        No current facility-administered medications for this visit. Allergies   Allergen Reactions    Dye [Iodides]     Invokana [Canagliflozin] Other (See Comments)    Liraglutide     Adhesive Tape Dermatitis     Tears her skin, skin red and irritated       Past Medical History:   Diagnosis Date    Acid reflux     Arthritis     Asthma     CHF [facility]congestive [facility] failure) ([facility] [address]     Patient dneies - states she does not have CHF.     CKD (chronic kidney disease) stage 3, GFR 30-59 ml/min ([facility])     Diabetes mellitus ([facility] [address]     [facility]     Hypertension     Sleep apnea        Past Surgical History:   Procedure Laterality Date    APPENDECTOMY      BACK SURGERY      neck x4    CHOLECYSTECTOMY      COLONOSCOPY      DILATION AND CURETTAGE OF UTERUS  x2    EYE SURGERY      bilateral cataracts    HYSTERECTOMY      KERATOPLASTY      KNEE SURGERY      both legs    AYAN-EN-Y GASTRIC BYPASS      TONSILLECTOMY         Social History     Socioeconomic History    Marital status:      Spouse name: None    Number of children: None    Years of education: None    Highest education level: None   Occupational History    None   Tobacco Use    Smoking status: Never Smoker    Smokeless tobacco: Never Used   Substance and Sexual Activity    Alcohol use: No    Drug use: No    Sexual activity: None   Other Topics Concern    None   Social History Narrative    None     Social Determinants of Health     Financial Resource Strain:     Difficulty of Paying Living Expenses: Not on file   Food Insecurity:     Worried About Running Out of Food in the Last Year: Not on file  Ran Out of Food in the Last Year: Not on file   Transportation Needs:     Lack of Transportation (Medical): Not on file    Lack of Transportation (Non-Medical): Not on file   Physical Activity:     Days of Exercise per Week: Not on file    Minutes of Exercise per Session: Not on file   Stress:     Feeling of Stress : Not on file   Social Connections:     Frequency of Communication with Friends and Family: Not on file    Frequency of Social Gatherings with Friends and Family: Not on file    Attends [employer] Services: Not on file    Active Member of [address] [place] [address] [employer] or Organizations: Not on file    Attends Club or Organization Meetings: Not on file    Marital Status: Not on file   Intimate Partner Violence:     Fear of Current or Ex-Partner: Not on file    Emotionally Abused: Not on file    Physically Abused: Not on file    Sexually Abused: Not on file   Housing Stability:     Unable to Pay for Housing in the Last Year: Not on file    Number of [place] in the Last Year: Not on file    Unstable Housing in the Last Year: Not on file       Review of Systems   Constitutional: Positive for fatigue. HENT: Negative for congestion and postnasal drip. Eyes: Negative for discharge and itching. Respiratory: Negative for cough and shortness of breath. Cardiovascular: Negative for chest pain and leg swelling. Gastrointestinal: Negative for abdominal distention and abdominal pain. Endocrine: Negative for cold intolerance and heat intolerance. Genitourinary: Negative for enuresis and frequency. Musculoskeletal: Negative for arthralgias and back pain. Allergic/Immunologic: Negative for environmental allergies. Neurological: Negative for light-headedness and headaches. Hematological: Negative for adenopathy. Psychiatric/Behavioral: Negative for agitation and behavioral problems.        Objective:   /64   Pulse 68   Ht 5' 2.5\" (1.588 m)   Wt 190 lb (86.2 kg)   SpO2 97%   BMI 34.20 kg/m²   Body mass index is 34.2 kg/m². Sleep Medicine 4/19/2021 4/1/2021 6/29/2018 6/14/2018 6/14/2018   Sitting and reading 3 3 2 2 -   Watching TV 2 2 2 1 -   Sitting, inactive in a public place (e.g. a theatre or a meeting) 0 0 2 1 -   As a passenger in a car for an hour without a break 2 2 0 2 -   Lying down to rest in the afternoon when circumstances permit 0 0 2 3 -   Sitting and talking to someone 2 2 2 1 -   Sitting quietly after a lunch without alcohol 2 2 1 1 -   In a car, while stopped for a few minutes in traffic 0 0 0 0 -   Total score 11 11 11 11 -   Neck circumference 13.5 - - 13 13     {MALLAMPATI:3    Physical Exam  Vitals reviewed. Constitutional:       Appearance: Normal appearance. Comments: Obesity   HENT:      Head: Normocephalic and atraumatic. Nose: Nose normal.      Mouth/Throat:      Mouth: Mucous membranes are moist.   Eyes:      Extraocular Movements: Extraocular movements intact. Pupils: Pupils are equal, round, and reactive to light. Cardiovascular:      Rate and Rhythm: Normal rate and regular rhythm. Pulses: Normal pulses. Heart sounds: Normal heart sounds. Pulmonary:      Effort: Pulmonary effort is normal.      Breath sounds: Normal breath sounds. Abdominal:      General: Abdomen is flat. Palpations: Abdomen is soft. Musculoskeletal:         General: Normal range of motion. Cervical back: Normal range of motion and neck supple. Skin:     General: Skin is warm and dry. Neurological:      General: No focal deficit present. Mental Status: She is alert and oriented to person, place, and time.    Psychiatric:         Mood and Affect: Mood normal.         Behavior: Behavior normal.         Radiology: None    Assessment and Plan     Problem List        Pulmonary Problems    MARQUISE (obstructive sleep apnea)      Patient is not able to tolerate the current CPAP as she has gained about 10 lbs   Advised to use Auto CPAP  Loose weight  Need 2 week download data         Relevant Orders    DME Order for CPAP as OP       Other    Obesity (BMI 30.0-34. 9)      Advised to loose weight with diet and exercise           Hypersomnia      Advised to be compliant with the Auto CPAP  Loose weight  Need 2 week download data                    Follow-Up:    Return in about 6 weeks (around 12/24/2021) for 2 week download data.      Progress notes sent to the referring Provider    Shay De Los Santos MD MD  11/12/2021  9:55 AM

## 2021-11-30 ENCOUNTER — HOSPITAL ENCOUNTER (OUTPATIENT)
Dept: ULTRASOUND IMAGING | Age: 66
Discharge: HOME | End: 2021-11-30
Payer: MEDICARE

## 2021-11-30 DIAGNOSIS — I82.491 ACUTE DEEP VEIN THROMBOSIS (DVT) OF OTHER SPECIFIED VEIN OF RIGHT LOWER EXTREMITY (HCC): ICD-10-CM

## 2021-11-30 PROCEDURE — 93971 EXTREMITY STUDY: CPT

## 2021-12-02 ENCOUNTER — HOSPITAL ENCOUNTER (OUTPATIENT)
Age: 66
Setting detail: OBSERVATION
Discharge: HOME | End: 2021-12-03
Attending: HOSPITALIST
Payer: COMMERCIAL

## 2021-12-02 ENCOUNTER — APPOINTMENT (OUTPATIENT)
Dept: GENERAL RADIOLOGY | Age: 66
End: 2021-12-02
Payer: COMMERCIAL

## 2021-12-02 ENCOUNTER — APPOINTMENT (OUTPATIENT)
Dept: NUCLEAR MEDICINE | Age: 66
End: 2021-12-02
Payer: COMMERCIAL

## 2021-12-02 DIAGNOSIS — R79.89 ELEVATED D-DIMER: ICD-10-CM

## 2021-12-02 DIAGNOSIS — R06.02 SHORTNESS OF BREATH: ICD-10-CM

## 2021-12-02 DIAGNOSIS — M79.604 RIGHT LEG PAIN: ICD-10-CM

## 2021-12-02 DIAGNOSIS — R07.9 CHEST PAIN, UNSPECIFIED TYPE: Primary | ICD-10-CM

## 2021-12-02 LAB
ALBUMIN SERPL-MCNC: 4.2 GM/DL (ref 3.4–5)
ALP BLD-CCNC: 156 IU/L (ref 40–129)
ALT SERPL-CCNC: 20 U/L (ref 10–40)
ANION GAP SERPL CALCULATED.3IONS-SCNC: 6 MMOL/L (ref 4–16)
AST SERPL-CCNC: 22 IU/L (ref 15–37)
BASOPHILS ABSOLUTE: 0 K/CU MM
BASOPHILS RELATIVE PERCENT: 0.5 % (ref 0–1)
BILIRUB SERPL-MCNC: 0.3 MG/DL (ref 0–1)
BUN BLDV-MCNC: 20 MG/DL (ref 6–23)
CALCIUM SERPL-MCNC: 9.3 MG/DL (ref 8.3–10.6)
CHLORIDE BLD-SCNC: 103 MMOL/L (ref 99–110)
CO2: 29 MMOL/L (ref 21–32)
CREAT SERPL-MCNC: 1.2 MG/DL (ref 0.6–1.1)
D DIMER: 666 NG/ML(DDU)
DIFFERENTIAL TYPE: ABNORMAL
EOSINOPHILS ABSOLUTE: 0.1 K/CU MM
EOSINOPHILS RELATIVE PERCENT: 1.4 % (ref 0–3)
GFR AFRICAN AMERICAN: 54 ML/MIN/1.73M2
GFR NON-AFRICAN AMERICAN: 45 ML/MIN/1.73M2
GLUCOSE BLD-MCNC: 110 MG/DL (ref 70–99)
HCT VFR BLD CALC: 45.2 % (ref 37–47)
HEMOGLOBIN: 14.6 GM/DL (ref 12.5–16)
IMMATURE NEUTROPHIL %: 0.1 % (ref 0–0.43)
LYMPHOCYTES ABSOLUTE: 2.7 K/CU MM
LYMPHOCYTES RELATIVE PERCENT: 33.5 % (ref 24–44)
MCH RBC QN AUTO: 30.5 PG (ref 27–31)
MCHC RBC AUTO-ENTMCNC: 32.3 % (ref 32–36)
MCV RBC AUTO: 94.4 FL (ref 78–100)
MONOCYTES ABSOLUTE: 0.7 K/CU MM
MONOCYTES RELATIVE PERCENT: 8 % (ref 0–4)
NUCLEATED RBC %: 0 %
PDW BLD-RTO: 12.3 % (ref 11.7–14.9)
PLATELET # BLD: 288 K/CU MM (ref 140–440)
PMV BLD AUTO: 9.5 FL (ref 7.5–11.1)
POTASSIUM SERPL-SCNC: 4.8 MMOL/L (ref 3.5–5.1)
PRO-BNP: 387.9 PG/ML
RBC # BLD: 4.79 M/CU MM (ref 4.2–5.4)
SARS-COV-2, NAAT: NOT DETECTED
SEGMENTED NEUTROPHILS ABSOLUTE COUNT: 4.6 K/CU MM
SEGMENTED NEUTROPHILS RELATIVE PERCENT: 56.5 % (ref 36–66)
SODIUM BLD-SCNC: 138 MMOL/L (ref 135–145)
SOURCE: NORMAL
T4 FREE: 1.21 NG/DL (ref 0.9–1.8)
TOTAL IMMATURE NEUTOROPHIL: 0.01 K/CU MM
TOTAL NUCLEATED RBC: 0 K/CU MM
TOTAL PROTEIN: 7.2 GM/DL (ref 6.4–8.2)
TROPONIN T: <0.01 NG/ML
TSH HIGH SENSITIVITY: 1.13 UIU/ML (ref 0.27–4.2)
WBC # BLD: 8.1 K/CU MM (ref 4–10.5)

## 2021-12-02 PROCEDURE — 85025 COMPLETE CBC W/AUTO DIFF WBC: CPT

## 2021-12-02 PROCEDURE — 80053 COMPREHEN METABOLIC PANEL: CPT

## 2021-12-02 PROCEDURE — 83036 HEMOGLOBIN GLYCOSYLATED A1C: CPT

## 2021-12-02 PROCEDURE — G0378 HOSPITAL OBSERVATION PER HR: HCPCS

## 2021-12-02 PROCEDURE — A9540 TC99M MAA: HCPCS | Performed by: PHYSICIAN ASSISTANT

## 2021-12-02 PROCEDURE — A9539 TC99M PENTETATE: HCPCS | Performed by: PHYSICIAN ASSISTANT

## 2021-12-02 PROCEDURE — 3430000000 HC RX DIAGNOSTIC RADIOPHARMACEUTICAL: Performed by: PHYSICIAN ASSISTANT

## 2021-12-02 PROCEDURE — 36415 COLL VENOUS BLD VENIPUNCTURE: CPT

## 2021-12-02 PROCEDURE — 84439 ASSAY OF FREE THYROXINE: CPT

## 2021-12-02 PROCEDURE — 84484 ASSAY OF TROPONIN QUANT: CPT

## 2021-12-02 PROCEDURE — 83880 ASSAY OF NATRIURETIC PEPTIDE: CPT

## 2021-12-02 PROCEDURE — 87635 SARS-COV-2 COVID-19 AMP PRB: CPT

## 2021-12-02 PROCEDURE — 93005 ELECTROCARDIOGRAM TRACING: CPT | Performed by: HOSPITALIST

## 2021-12-02 PROCEDURE — 78582 LUNG VENTILAT&PERFUS IMAGING: CPT

## 2021-12-02 PROCEDURE — 71046 X-RAY EXAM CHEST 2 VIEWS: CPT

## 2021-12-02 PROCEDURE — 85379 FIBRIN DEGRADATION QUANT: CPT

## 2021-12-02 PROCEDURE — 99283 EMERGENCY DEPT VISIT LOW MDM: CPT

## 2021-12-02 PROCEDURE — 84443 ASSAY THYROID STIM HORMONE: CPT

## 2021-12-02 RX ORDER — KIT FOR THE PREPARATION OF TECHNETIUM TC 99M PENTETATE 20 MG/1
3 INJECTION, POWDER, LYOPHILIZED, FOR SOLUTION INTRAVENOUS; RESPIRATORY (INHALATION)
Status: COMPLETED | OUTPATIENT
Start: 2021-12-02 | End: 2021-12-02

## 2021-12-02 RX ADMIN — Medication 6 MILLICURIE: at 22:04

## 2021-12-02 RX ADMIN — KIT FOR THE PREPARATION OF TECHNETIUM TC 99M PENTETATE 3 MILLICURIE: 20 INJECTION, POWDER, LYOPHILIZED, FOR SOLUTION INTRAVENOUS; RESPIRATORY (INHALATION) at 22:04

## 2021-12-02 ASSESSMENT — PAIN SCALES - GENERAL: PAINLEVEL_OUTOF10: 6

## 2021-12-02 ASSESSMENT — PAIN DESCRIPTION - PAIN TYPE: TYPE: ACUTE PAIN

## 2021-12-02 ASSESSMENT — HEART SCORE: ECG: 0

## 2021-12-02 ASSESSMENT — PAIN DESCRIPTION - LOCATION: LOCATION: GENERALIZED

## 2021-12-02 NOTE — Clinical Note
Patient Class: Observation [104]   REQUIRED: Diagnosis: Chest pain [005006]   Estimated Length of Stay: Estimated stay of less than 2 midnights

## 2021-12-03 ENCOUNTER — APPOINTMENT (OUTPATIENT)
Dept: ULTRASOUND IMAGING | Age: 66
End: 2021-12-03
Payer: COMMERCIAL

## 2021-12-03 VITALS
HEART RATE: 94 BPM | WEIGHT: 190 LBS | DIASTOLIC BLOOD PRESSURE: 76 MMHG | SYSTOLIC BLOOD PRESSURE: 119 MMHG | BODY MASS INDEX: 34.2 KG/M2 | TEMPERATURE: 98.7 F | RESPIRATION RATE: 16 BRPM | OXYGEN SATURATION: 99 %

## 2021-12-03 LAB
EKG ATRIAL RATE: 46 BPM
EKG DIAGNOSIS: NORMAL
EKG P AXIS: 67 DEGREES
EKG P-R INTERVAL: 152 MS
EKG Q-T INTERVAL: 448 MS
EKG QRS DURATION: 70 MS
EKG QTC CALCULATION (BAZETT): 392 MS
EKG R AXIS: 20 DEGREES
EKG T AXIS: 35 DEGREES
EKG VENTRICULAR RATE: 46 BPM
ESTIMATED AVERAGE GLUCOSE: 114 MG/DL
HBA1C MFR BLD: 5.6 % (ref 4.2–6.3)
LV EF: 53 %
LVEF MODALITY: NORMAL
TROPONIN T: <0.01 NG/ML

## 2021-12-03 PROCEDURE — 96372 THER/PROPH/DIAG INJ SC/IM: CPT

## 2021-12-03 PROCEDURE — G0378 HOSPITAL OBSERVATION PER HR: HCPCS

## 2021-12-03 PROCEDURE — 84484 ASSAY OF TROPONIN QUANT: CPT

## 2021-12-03 PROCEDURE — 93306 TTE W/DOPPLER COMPLETE: CPT

## 2021-12-03 PROCEDURE — 6370000000 HC RX 637 (ALT 250 FOR IP): Performed by: NURSE PRACTITIONER

## 2021-12-03 PROCEDURE — 6360000002 HC RX W HCPCS: Performed by: NURSE PRACTITIONER

## 2021-12-03 PROCEDURE — 83721 ASSAY OF BLOOD LIPOPROTEIN: CPT

## 2021-12-03 PROCEDURE — 93971 EXTREMITY STUDY: CPT

## 2021-12-03 PROCEDURE — 2580000003 HC RX 258: Performed by: NURSE PRACTITIONER

## 2021-12-03 PROCEDURE — 93010 ELECTROCARDIOGRAM REPORT: CPT | Performed by: INTERNAL MEDICINE

## 2021-12-03 PROCEDURE — 99204 OFFICE O/P NEW MOD 45 MIN: CPT | Performed by: INTERNAL MEDICINE

## 2021-12-03 RX ORDER — ACETAMINOPHEN 650 MG/1
650 SUPPOSITORY RECTAL EVERY 6 HOURS PRN
Status: DISCONTINUED | OUTPATIENT
Start: 2021-12-03 | End: 2021-12-03 | Stop reason: HOSPADM

## 2021-12-03 RX ORDER — MAGNESIUM OXIDE 400 MG/1
400 TABLET ORAL DAILY
Status: DISCONTINUED | OUTPATIENT
Start: 2021-12-03 | End: 2021-12-03 | Stop reason: HOSPADM

## 2021-12-03 RX ORDER — POLYETHYLENE GLYCOL 3350 17 G/17G
17 POWDER, FOR SOLUTION ORAL DAILY PRN
Status: DISCONTINUED | OUTPATIENT
Start: 2021-12-03 | End: 2021-12-03 | Stop reason: HOSPADM

## 2021-12-03 RX ORDER — ACETAMINOPHEN 325 MG/1
650 TABLET ORAL EVERY 6 HOURS PRN
Status: DISCONTINUED | OUTPATIENT
Start: 2021-12-03 | End: 2021-12-03 | Stop reason: HOSPADM

## 2021-12-03 RX ORDER — BUDESONIDE AND FORMOTEROL FUMARATE DIHYDRATE 160; 4.5 UG/1; UG/1
2 AEROSOL RESPIRATORY (INHALATION) 2 TIMES DAILY
Status: DISCONTINUED | OUTPATIENT
Start: 2021-12-03 | End: 2021-12-03 | Stop reason: HOSPADM

## 2021-12-03 RX ORDER — ASPIRIN 81 MG/1
81 TABLET, CHEWABLE ORAL DAILY
Status: DISCONTINUED | OUTPATIENT
Start: 2021-12-03 | End: 2021-12-03 | Stop reason: HOSPADM

## 2021-12-03 RX ORDER — FLUTICASONE PROPIONATE 50 MCG
1 SPRAY, SUSPENSION (ML) NASAL DAILY
Status: DISCONTINUED | OUTPATIENT
Start: 2021-12-03 | End: 2021-12-03 | Stop reason: HOSPADM

## 2021-12-03 RX ORDER — NITROGLYCERIN 0.4 MG/1
0.4 TABLET SUBLINGUAL EVERY 5 MIN PRN
Status: DISCONTINUED | OUTPATIENT
Start: 2021-12-03 | End: 2021-12-03 | Stop reason: HOSPADM

## 2021-12-03 RX ORDER — ONDANSETRON 2 MG/ML
4 INJECTION INTRAMUSCULAR; INTRAVENOUS EVERY 6 HOURS PRN
Status: DISCONTINUED | OUTPATIENT
Start: 2021-12-03 | End: 2021-12-03 | Stop reason: HOSPADM

## 2021-12-03 RX ORDER — SODIUM CHLORIDE 0.9 % (FLUSH) 0.9 %
5-40 SYRINGE (ML) INJECTION PRN
Status: DISCONTINUED | OUTPATIENT
Start: 2021-12-03 | End: 2021-12-03 | Stop reason: HOSPADM

## 2021-12-03 RX ORDER — SODIUM CHLORIDE 0.9 % (FLUSH) 0.9 %
5-40 SYRINGE (ML) INJECTION EVERY 12 HOURS SCHEDULED
Status: DISCONTINUED | OUTPATIENT
Start: 2021-12-03 | End: 2021-12-03 | Stop reason: HOSPADM

## 2021-12-03 RX ORDER — CHOLECALCIFEROL (VITAMIN D3) 125 MCG
10 CAPSULE ORAL NIGHTLY
Status: DISCONTINUED | OUTPATIENT
Start: 2021-12-03 | End: 2021-12-03 | Stop reason: HOSPADM

## 2021-12-03 RX ORDER — SODIUM CHLORIDE 9 MG/ML
25 INJECTION, SOLUTION INTRAVENOUS PRN
Status: DISCONTINUED | OUTPATIENT
Start: 2021-12-03 | End: 2021-12-03 | Stop reason: HOSPADM

## 2021-12-03 RX ORDER — ESCITALOPRAM OXALATE 10 MG/1
10 TABLET ORAL DAILY
Status: DISCONTINUED | OUTPATIENT
Start: 2021-12-03 | End: 2021-12-03 | Stop reason: HOSPADM

## 2021-12-03 RX ORDER — ONDANSETRON 4 MG/1
4 TABLET, ORALLY DISINTEGRATING ORAL EVERY 8 HOURS PRN
Status: DISCONTINUED | OUTPATIENT
Start: 2021-12-03 | End: 2021-12-03 | Stop reason: HOSPADM

## 2021-12-03 RX ORDER — TRAZODONE HYDROCHLORIDE 50 MG/1
50 TABLET ORAL NIGHTLY
Status: DISCONTINUED | OUTPATIENT
Start: 2021-12-03 | End: 2021-12-03 | Stop reason: HOSPADM

## 2021-12-03 RX ADMIN — ENOXAPARIN SODIUM 40 MG: 40 INJECTION SUBCUTANEOUS at 09:32

## 2021-12-03 RX ADMIN — ASPIRIN 81 MG: 81 TABLET, CHEWABLE ORAL at 09:32

## 2021-12-03 RX ADMIN — MAGNESIUM OXIDE 400 MG: 400 TABLET ORAL at 09:32

## 2021-12-03 RX ADMIN — Medication 10 ML: at 09:33

## 2021-12-03 RX ADMIN — POTASSIUM BICARBONATE 40 MEQ: 782 TABLET, EFFERVESCENT ORAL at 09:32

## 2021-12-03 ASSESSMENT — PAIN SCALES - GENERAL: PAINLEVEL_OUTOF10: 0

## 2021-12-03 NOTE — ED NOTES
Report given to [facility] RN at this time      Emanuel Delgadillo RN  12/03/21 2515

## 2021-12-03 NOTE — ED PROVIDER NOTES
ED attending EKG interpretation (I otherwise did not participate in the care of this patient)     EKG Interpretation  Interpreted by me  Compared to 1/6/2015  Rhythm: sinus bradycardia  Rate: bradycardic 46  Axis: normal  Ectopy: none  Conduction: normal  ST Segments: no acute change  T Waves: no acute change  Clinical Impression: sinus bradycardia has replaced normal sinus rhythm seen on prior EKG, no other acute changes     Tom Alonso MD  12/02/21 5652

## 2021-12-03 NOTE — ED NOTES
NM LUNG VENT/PERFUSION (VQ)    Status: Final result       Order Providers    Authorizing Billing   JEAN CLAUDE Atwood Right   Replaced: NM LUNG SCAN PERFUSION ONLY            Signed by    Signed Date/Time Phone Pager   Yaawalker Santiago 12/02/2021 10:41 [phone]      Reading Providers    Read Date Phone Pager   Juany Henderson Thu Dec 2, 2021 10:41 [phone]        NM LUNG VENT/PERFUSION (VQ): Patient Communication     Not Released  Not seen     Radiation Dose Estimates    No radiation information found for this patient  Narrative   EXAMINATION:   NUCLEAR MEDICINE VENTILATION PERFUSION SCAN. 12/2/2021       TECHNIQUE:   92.9 millicuries aerosolized Tc99m DTPA was administered via mask prior to   planar imaging of the lungs in multiple projections.  Then, 5 millicuries of   Tc 42F MAA was administered intravenously prior to planar imaging of the   lungs in similar projections.       COMPARISON:   Chest radiograph 12/02/2021.       HISTORY:   ORDERING SYSTEM PROVIDED HISTORY: SOB, history of DVT, not on   anticoagulation, eval for PE   TECHNOLOGIST PROVIDED HISTORY:   Reason for exam:->SOB, history of DVT, not on anticoagulation, eval for PE   Reason for Exam: hx of DVT, SOB   Acuity: Acute   Type of Exam: Initial       FINDINGS:   PERFUSION:       Good quality perfusion images with a small matched defect in the left upper   lung.       VENTILATION:       Small matched defect in the left upper lung. .       CHEST RADIOGRAPH:       No focal areas of consolidation or significant effusions on recent chest   radiograph.           Impression   Low probability for pulmonary embolus.              Luke Purcell RN  12/03/21 0105

## 2021-12-03 NOTE — DISCHARGE SUMMARY
Discharge Summary    Name:  Loreta Sheets /Age/Sex: 1955  (74 y.o. female)   MRN & CSN:  [id number] & [id number] Admission Date/Time: 2021  8:02 PM   Attending:  Mari Mahajan MD Discharging Physician: Luis Schultz, [facility] Course:   Loreta Sheets is a 74 y.o.  female  who presents with chest pain.      Chest pain   - has history of DVT off Xarelto   - unable to perform CTA due to allergy to IV dye, VQ scan with low probability for PE  - reported outpatient RLEVD with no DVT   - EKG with no acute ischemic change   - troponin negative x2  - echo with EF 50%, PFO visualized  -On day of discharge, chest pain resolved, vital signs stable  - cardiology consulted -no further recommendations and okay for discharge    History of DVT  -Reported multiple occurrences in right lower extremity  -Was on Xarelto, was taken off, patient reports this was discontinued because she had lost weight  - admit D-dimer 666  - unable to perform CTA due to allergy to IV dye, VQ scan with low probability for PE  - reported outpatient RLEVD with no DVT   - LLEVD with no DVT  -Strongly recommended outpatient follow-up with PCP to consider restarting Xarelto     CKDIII  - admit Cr 1.2, near baseline   - avoid nephrotoxins   -Continue to monitor renal function as outpatient    This patient was seen and examined autonomously  A hospitalist attending physician was available for questions/consultation as needed. The patient expressed appropriate understanding of and agreement with the discharge recommendations, medications, and plan.      Consults this admission:  IP CONSULT TO HOSPITALIST  IP CONSULT TO CARDIOLOGY    Discharge Instruction:   Follow up appointments: PCP  Primary care physician:  within 2 weeks    Diet:  regular diet   Activity: activity as tolerated  Disposition: Discharged to:   [x]Home, []HHC, []SNF, []Acute Rehab, []Hospice   Condition on discharge: Stable    Discharge Medications:        Medication List      ASK your doctor about these medications    Alevazol 1 % Oint  Generic drug: Clotrimazole     Alive Womens Gummy Chew     Biotin 1000 MCG Tabs     * calcium carbonate 500 MG Tabs tablet  Commonly known as: OSCAL     * calcium carbonate 500 MG Tabs tablet  Commonly known as: OSCAL     escitalopram 10 MG tablet  Commonly known as: LEXAPRO     fluticasone 50 MCG/ACT nasal spray  Commonly known as: FLONASE     fluticasone-salmeterol 250-50 MCG/DOSE Aepb  Commonly known as: ADVAIR     magnesium oxide 400 MG tablet  Commonly known as: MAG-OX     melatonin 10 MG Caps capsule     Naphcon-A 0.025-0.3 % ophthalmic solution  Generic drug: naphazoline-pheniramine  Place 1 drop into the right eye 4 times daily     naproxen 500 MG tablet  Commonly known as: Naprosyn  Take 1 tablet by mouth 2 times daily     potassium chloride 20 MEQ packet  Commonly known as: KLOR-CON     PROFE PO     traZODone 50 MG tablet  Commonly known as: DESYREL     VITAMIN B COMPLEX PO     Vitamin B-12 2500 MCG Subl     Vitamin C 500 MG Caps     vitamin D 1.25 MG (32051 UT) Caps capsule  Commonly known as: ERGOCALCIFEROL     XARELTO PO     zinc gluconate 50 MG tablet         * This list has 2 medication(s) that are the same as other medications prescribed for you. Read the directions carefully, and ask your doctor or other care provider to review them with you. Objective Findings at Discharge:   /62   Pulse 51   Temp 98.7 °F (37.1 °C) (Oral)   Resp 18   Wt 190 lb (86.2 kg)   SpO2 97%   BMI 34.20 kg/m²            PHYSICAL EXAM   GEN Awake female, sitting upright in bed in no apparent distress. Appears given age. EYES Pupils are equally round. Gaze conjugate. HENT Mucous membranes are moist.   NECK Supple, no apparent thyromegaly or masses. RESP Respirations even and unlabored on RA. CARDIO/VASC S1/S2 auscultated. Regular rate without appreciable murmurs, rubs, or gallops.   GI Abdomen is soft without significant tenderness, masses, or guarding.   Chiu catheter is not present. HEME/LYMPH No petechiae or ecchymoses. MSK No gross joint deformities. SKIN Normal coloration, warm, dry. NEURO Cranial nerves appear grossly intact, normal speech, no lateralizing weakness. PSYCH Awake, alert, oriented x 4. Affect appropriate.     BMP/CBC  Recent Labs     12/02/21  1821      K 4.8      CO2 29   BUN 20   CREATININE 1.2*   WBC 8.1   HCT 45.2          IMAGING:  X-ray with no acute process, VQ scan with low probability for PE    Discharge Time of 35 minutes    Electronically signed by Heloise Alpers, PA-C on 12/3/2021 at 12:48 PM

## 2021-12-03 NOTE — ED PROVIDER NOTES
EMERGENCY DEPARTMENT ENCOUNTER    [facility] EMERGENCY DEPARTMENT        TRIAGE CHIEF COMPLAINT:   Shortness of Breath (sent by imaging center for study to be completed because patient is allergic to IVP dye that was ordered for ct of the chest to rule out blood clot)      [place] a 74 y.o. female that presents for chest pain or shortness of breath. Onset was prior travel, x3 days ago. 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 States she believes is secondary to [email or web address] and follows with hematology, Dr. Dash Corona but is not currently on anticoagulation therapy. States for the last several days she began feeling \"sick\" with shortness of breath and intermittent pleuritic and exertional chest pain. States that \"my chest feels funny\" but denying any palpitations. She also noted increased swelling pain and tightness in the right calf. She had an outpatient ultrasound of her right lower leg through PCP and was told that it was negative for signs for a blood clot however family doctor sent her to outpatient imaging center for CTA chest for PE rule out. However patient had reported an allergy to IV contrast dye including \"swelling itching and redness. \"  She was sent from imaging center to have PE rule out imaging studies through the emergency department. Denying any dizziness lightheadedness, hemoptysis. No recent long travel hospitalizations or surgeries. No other abdominal or urinary complaints. Denying any numbness or tingling into the lower extremities. Patient does state she has a history of hypertension, hyperlipidemia and diabetes, on oral antihyperglycemics only. She has not been seen by cardiologist in over 5 years, states she has never had a heart catheterization or stress test.  At time of evaluation, patient is denying any chest pain but states it \"comes and goes. \"    ROS:  General: No fevers, no chills   Cardiovascular: See HPI    Respiratory: See HPI  Gastrointestinal:  No pain, no nausea, no vomiting, no diarrhea  Musculoskeletal: See HPI  Skin:  No rash, no pruritis, no easy bruising  Neurologic:  No speech problems, no headache, no extremity numbness, no extremity tingling, no extremity weakness  Psychiatric:  No anxiety  Genitourinary:  No dysuria, no hematuria  Extremities: See HPI    Past Medical History:   Diagnosis Date    Acid reflux     Arthritis     Asthma     CHF [facility]congestive heart failure) ([facility])     Patient dneies - states she does not have CHF.  CKD (chronic kidney disease) stage 3, GFR 30-59 ml/min ([facility])     Diabetes mellitus [facility]     DVT (deep venous thrombosis) ([facility]     Hypertension     Sleep apnea      Past Surgical History:   Procedure Laterality Date    APPENDECTOMY      BACK SURGERY      neck x4    CHOLECYSTECTOMY      COLONOSCOPY      DILATION AND CURETTAGE OF UTERUS  x2    EYE SURGERY      bilateral cataracts    HYSTERECTOMY      KERATOPLASTY      KNEE SURGERY      both legs    AYAN-EN-Y GASTRIC BYPASS      TONSILLECTOMY       No family history on file.   Social History     Socioeconomic History    Marital status:      Spouse name: Not on file    Number of children: Not on file    Years of education: Not on file    Highest education level: Not on file   Occupational History    Not on file   Tobacco Use    Smoking status: Never Smoker    Smokeless tobacco: Never Used   Substance and Sexual Activity    Alcohol use: No    Drug use: No    Sexual activity: Not on file   Other Topics Concern    Not on file   Social History Narrative    Not on file     Social Determinants of Health     Financial Resource Strain:     Difficulty of Paying Living Expenses: Not on file   Food Insecurity:     Worried About Running Out of Food in the Last Year: Not on file    Rainer of Food in the Last Year: Not on file   Transportation Needs:     Lack of Transportation (Medical): Not on file    Lack of Transportation (Non-Medical): Not on file   Physical Activity:     Days of Exercise per Week: Not on file    Minutes of Exercise per Session: Not on file   Stress:     Feeling of Stress : Not on file   Social Connections:     Frequency of Communication with Friends and Family: Not on file    Frequency of Social Gatherings with Friends and Family: Not on file    Attends Voodoo Services: Not on file    Active Member of [address] [place] [address] [place] or Organizations: Not on file    Attends Club or Organization Meetings: Not on file    Marital Status: Not on file   Intimate Partner Violence:     Fear of Current or Ex-Partner: Not on file    Emotionally Abused: Not on file    Physically Abused: Not on file    Sexually Abused: Not on file   Housing Stability:     Unable to Pay for Housing in the Last Year: Not on file    Number of [place] in the Last Year: Not on file    Unstable Housing in the Last Year: Not on file     No current facility-administered medications for this encounter.      Current Outpatient Medications   Medication Sig Dispense Refill    naphazoline-pheniramine (NAPHCON-A) 0.025-0.3 % ophthalmic solution Place 1 drop into the right eye 4 times daily 1 Bottle 0    escitalopram (LEXAPRO) 10 MG tablet Take 10 mg by mouth daily      Polysaccharide Iron Complex (PROFE PO) Take 180 mg by mouth      melatonin 10 MG CAPS capsule Take 10 mg by mouth nightly      Ascorbic Acid (VITAMIN C) 500 MG CAPS Take by mouth      traZODone (DESYREL) 50 MG tablet Take 50 mg by mouth nightly      Clotrimazole (ALEVAZOL) 1 % OINT Apply topically      Biotin 1000 MCG TABS Take 1 mg by mouth      fluticasone (FLONASE) 50 MCG/ACT nasal spray 1 spray by Nasal route daily as needed      calcium carbonate (OSCAL) 500 MG TABS tablet Take 2 tablets by mouth      Cyanocobalamin (VITAMIN B-12) 2500 MCG SUBL Place 1 each under the tongue      magnesium oxide (MAG-OX) 400 MG tablet Take 400 mg by mouth daily      naproxen (NAPROSYN) 500 MG tablet Take 1 tablet by mouth 2 times daily 10 tablet 0    zinc gluconate 50 MG tablet Take 50 mg by mouth daily      Multiple Vitamins-Minerals (ALIVE WOMENS GUMMY) CHEW Take 2 tablets by mouth daily      calcium carbonate (OSCAL) 500 MG TABS tablet Take 500 mg by mouth daily      B Complex Vitamins (VITAMIN B COMPLEX PO) Take by mouth      potassium chloride (KLOR-CON) 20 MEQ packet Take 40 mEq by mouth daily.  fluticasone-salmeterol (ADVAIR) 250-50 MCG/DOSE AEPB Inhale 1 puff into the lungs every 12 hours.  Rivaroxaban (XARELTO PO) Take 20 mg by mouth daily.  vitamin D (ERGOCALCIFEROL) 71935 UNITS CAPS capsule Take 10,000 Units by mouth Twice a Week        Allergies   Allergen Reactions    Dye [Iodides]     Invokana [Canagliflozin] Other (See Comments)    Liraglutide     Adhesive Tape Dermatitis     Tears her skin, skin red and irritated       Nursing Notes Reviewed  PHYSICAL EXAM    VITAL SIGNS: BP (!) 139/36   Pulse 59   Temp 97.7 °F (36.5 °C) (Oral)   Resp 15   Wt 190 lb (86.2 kg)   SpO2 95%   BMI 34.20 kg/m²    Constitutional:  Well developed, elevated BMI, nontoxic   head:  Normocephalic, Atraumatic  Eyes:   EOMI. Sclera clear. Conjunctiva normal, No discharge. Neck/Lymphatics: Supple, no JVD. Trachea is midline. Cardiovascular: Borderline rate 60 bpm, regular rhythm, Normal S1 & S2    Peripheral Vascular: Distal pulses 2+, Capillary refill <2seconds  Respiratory:  Respirations nonnlabored, speaking full sentences without difficulty. Clear to auscultation bilaterally, No retractions  Abdomen: Bowel sounds normal in all quadrants, Soft, Non tender/Nondistended, No palpable abdominal masses. Musculoskeletal: BUE/BLE symmetrical without atrophy or deformities. Calves are supple however 1+ equal pretibial pitting edema bilaterally. Compartments are soft throughout the bilateral lower legs.   Integument: Warm, Dry, Intact, Skin turgor and texture normal  Neurologic:  Alert & oriented x3 , No focal deficits noted. Cranial nerves II through XII grossly intact. No slurred speech. No facial droop. Normal gross motor coordination & motor strength bilateral upper and lower extremities.    Psychiatric:  Affect appropriate      I have reviewed and interpreted all of the currently available lab results from this visit (if applicable):  Results for orders placed or performed during the hospital encounter of 12/02/21   COVID-19, Rapid    Specimen: Nasopharyngeal   Result Value Ref Range    Source THROAT     SARS-CoV-2, NAAT NOT DETECTED NOT DETECTED   CBC auto diff   Result Value Ref Range    WBC 8.1 4.0 - 10.5 K/CU MM    RBC 4.79 4.2 - 5.4 M/CU MM    Hemoglobin 14.6 12.5 - 16.0 GM/DL    Hematocrit 45.2 37 - 47 %    MCV 94.4 78 - 100 FL    MCH 30.5 27 - 31 PG    MCHC 32.3 32.0 - 36.0 %    RDW 12.3 11.7 - 14.9 %    Platelets 264 443 - 123 K/CU MM    MPV 9.5 7.5 - 11.1 FL    Differential Type AUTOMATED DIFFERENTIAL     Segs Relative 56.5 36 - 66 %    Lymphocytes % 33.5 24 - 44 %    Monocytes % 8.0 (H) 0 - 4 %    Eosinophils % 1.4 0 - 3 %    Basophils % 0.5 0 - 1 %    Segs Absolute 4.6 K/CU MM    Lymphocytes Absolute 2.7 K/CU MM    Monocytes Absolute 0.7 K/CU MM    Eosinophils Absolute 0.1 K/CU MM    Basophils Absolute 0.0 K/CU MM    Nucleated RBC % 0.0 %    Total Nucleated RBC 0.0 K/CU MM    Total Immature Neutrophil 0.01 K/CU MM    Immature Neutrophil % 0.1 0 - 0.43 %   CMP   Result Value Ref Range    Sodium 138 135 - 145 MMOL/L    Potassium 4.8 3.5 - 5.1 MMOL/L    Chloride 103 99 - 110 mMol/L    CO2 29 21 - 32 MMOL/L    BUN 20 6 - 23 MG/DL    CREATININE 1.2 (H) 0.6 - 1.1 MG/DL    Glucose 110 (H) 70 - 99 MG/DL    Calcium 9.3 8.3 - 10.6 MG/DL    Albumin 4.2 3.4 - 5.0 GM/DL    Total Protein 7.2 6.4 - 8.2 GM/DL    Total Bilirubin 0.3 0.0 - 1.0 MG/DL    ALT 20 10 - 40 U/L    AST 22 15 - 37 IU/L    Alkaline Phosphatase 156 (H) 40 - 129 IU/L    GFR Non- 45 (L) >60 mL/min/1.73m2    GFR  54 (L) >60 mL/min/1.73m2    Anion Gap 6 4 - 16   Troponin   Result Value Ref Range    Troponin T <0.010 <0.01 NG/ML   Brain Natriuretic Peptide   Result Value Ref Range    Pro-.9 (H) <300 PG/ML   TSH without Reflex   Result Value Ref Range    TSH, High Sensitivity 1.130 0.270 - 4.20 uIu/ml   T4, free   Result Value Ref Range    T4 Free 1.21 0.9 - 1.8 NG/DL   D-dimer, Quantitative   Result Value Ref Range    D-Dimer, Quant 666 (H) <230 NG/mL(DDU)   EKG 12 Lead   Result Value Ref Range    Ventricular Rate 46 BPM    Atrial Rate 46 BPM    P-R Interval 152 ms    QRS Duration 70 ms    Q-T Interval 448 ms    QTc Calculation (Bazett) 392 ms    P Axis 67 degrees    R Axis 20 degrees    T Axis 35 degrees    Diagnosis       Sinus bradycardia  Otherwise normal ECG  No previous ECGs available          Radiographs (if obtained):  [] The following radiograph was interpreted by myself in the absence of a radiologist:   [] Radiologist's Report Reviewed:  NM LUNG VENT/PERFUSION (VQ)   Final Result   Low probability for pulmonary embolus. XR CHEST (2 VW)   Final Result   No acute abnormality identified.                 NM LUNG VENT/PERFUSION (VQ) (Final result)  Result time 12/02/21 22:41:48  Procedure changed from [address]  Final result by Del Britt (12/02/21 22:41:48)                Impression:    Low probability for pulmonary embolus. Narrative:    EXAMINATION:   NUCLEAR MEDICINE VENTILATION PERFUSION SCAN. 12/2/2021     TECHNIQUE:   90.7 millicuries aerosolized Tc99m DTPA was administered via mask prior to   planar imaging of the lungs in multiple projections.  Then, 5 millicuries of   Tc 15B MAA was administered intravenously prior to planar imaging of the   lungs in similar projections. COMPARISON:   Chest radiograph 12/02/2021.      HISTORY:   ORDERING SYSTEM PROVIDED HISTORY: SOB, history of DVT, not on   anticoagulation, eval for PE   TECHNOLOGIST PROVIDED HISTORY:   Reason for exam:->SOB, history of DVT, not on anticoagulation, eval for PE   Reason for Exam: hx of DVT, SOB   Acuity: Acute   Type of Exam: Initial     FINDINGS:   PERFUSION:     Good quality perfusion images with a small matched defect in the left upper   lung. VENTILATION:     Small matched defect in the left upper lung. .     CHEST RADIOGRAPH:     No focal areas of consolidation or significant effusions on recent chest   radiograph.                       XR CHEST (2 VW) (Final result)  Result time 12/02/21 17:36:26  Final result by Anthony Gustafson MD (12/02/21 17:36:26)                Impression:    No acute abnormality identified. Narrative:    EXAMINATION:   TWO XRAY VIEWS OF THE CHEST     12/2/2021 2:16 pm     COMPARISON:   02/18/2016     HISTORY:   ORDERING SYSTEM PROVIDED HISTORY: shortness of breath   TECHNOLOGIST PROVIDED HISTORY:   Reason for exam:->shortness of breath   Reason for Exam: shortness of breath   Acuity: Unknown   Type of Exam: Unknown     FINDINGS:   Mild elevation of the right hemidiaphragm is unchanged.  No acute focal   infiltrate is identified.  The cardial pericardial silhouette is stable.  No   pneumothorax is identified.  No free air.  No acute bony abnormality detected.                         EKG Interpretation  Please see ED physician's note - Dr. Nikolas Mcmahon - for EKG interpretation        Chart review shows recent radiographs:  XR CHEST (2 VW)    Result Date: 12/2/2021  EXAMINATION: TWO XRAY VIEWS OF THE CHEST 12/2/2021 2:16 pm COMPARISON: 02/18/2016 HISTORY: ORDERING SYSTEM PROVIDED HISTORY: shortness of breath TECHNOLOGIST PROVIDED HISTORY: Reason for exam:->shortness of breath Reason for Exam: shortness of breath Acuity: Unknown Type of Exam: Unknown FINDINGS: Mild elevation of the right hemidiaphragm is unchanged. No acute focal infiltrate is identified.   The cardial pericardial silhouette is stable. No pneumothorax is identified. No free air. No acute bony abnormality detected. No acute abnormality identified. VL LOWER EXTREMITY VENOUS RIGHT    Result Date: 11/30/2021  EXAMINATION: DUPLEX VENOUS ULTRASOUND OF THE RIGHT LOWER EXTREMITY 11/30/2021 11:15 am TECHNIQUE: Duplex ultrasound using B-mode/gray scaled imaging and Doppler spectral analysis and color flow was obtained of the deep venous structures of the right extremity. COMPARISON: None. HISTORY: ORDERING SYSTEM PROVIDED HISTORY: Acute deep vein thrombosis (DVT) of other specified vein of right lower extremity [facility]) TECHNOLOGIST PROVIDED HISTORY: Pain and swelling. Reason for Exam: Acute deep vein thrombosis (DVT) of other specified vein of right lower extremity [facility] FINDINGS: The visualized veins of the right lower extremity are patent and free of echogenic thrombus. The veins demonstrate good compressibility with normal color flow study and spectral analysis. No evidence of DVT in the right lower extremity. ED COURSE & MEDICAL DECISION MAKING       Vital signs and nursing notes reviewed during ED course. I have independently evaluated this patient . Supervising physician - Dr. Radha Acosta - was present in ED and available for consultation throughout entirety of patient's care. All pertinent Lab data and radiographic results reviewed with patient at bedside. The patient and/or the family were informed of the results of any tests/labs/imaging, the treatment plan, and time was allotted to answer questions. Clinical Impression:  1. Chest pain, unspecified type    2. Shortness of breath    3. Right leg pain    4. Elevated d-dimer        Patient presents for PE rule out with chest pain or shortness of breath. On exam, pleasant 49-year-old female, in no acute respiratory distress. Ambulating and weightbearing on the lower legs in the ED. No increased work of breathing.   Not tachycardic hypoxic or tachypneic. No increased work of breathing, speaking full sentence out difficulty. Lungs with clear equal auscultation. Lower legs are symmetrical with equal 1+ pretibial edema. Calves are supple. Compartments are soft throughout. Patient's work-up was initiated triage. Did add on additional thyroid panel, D-dimer and VQ scan. I did speak with nuclear medicine tech who is requesting a rapid Covid test as well as IV. CBC with normal white count hemoglobin. CMP reveals a creatinine of 1.2 which appears similar to baseline. Normal BUN, electrolytes. TSH/Free T4 and troponin are within normal range. BNP detectable at 387 however within normal range for age. D dimer is 666. Chest x-ray shows no evidence of acute cardiopulmonary process. Rapid covid is negative. VQ scan shows low probability for an acute PE. Given age, history and risk factors, heart score is 5. Do have concern the presenting symptoms concerning for ACS and do feel patient would benefit from admission for AC/chest pain rule out, especially in light of no recent cardiac work-up. She is comfortable and agreeable this plan. I did consult with Karolyn Bamberger CNP - hospitalist - and discussed patient's history, ED presentation/course including any pertinent laboratory findings and imaging study findings. He/she agrees to hospital admission. Patient is admitted to the hospital in stable condition. In consideration of current COVID19 pandemic, with effort to minimize unnecessary provider exposure, this patient was seen at bedside by me independently. However, in compliance with current hospital EFE/ED protocol, prior to admission I did discuss this patient case with emergency department physician, Dr. Cherie Carson, who did agree with ED workup/evaluation and plan for admission however but ED attending physician did not independently evaluate the patient. Of note, this Pt was NOT admitted to the ICU.       Diagnosis and plan discussed in detail with patient who understands and agrees. Disposition referral (if applicable):  No follow-up provider specified.     Disposition medications (if applicable):  New Prescriptions    No medications on file         (Please note that portions of this note may have been completed with a voice recognition program. Efforts were made to edit the dictations but occasionally words are mis-transcribed.)          Pranay Hathaway PA-C  12/02/21 [id number]

## 2021-12-03 NOTE — ED NOTES
Patient stated that she is to be discharged. Messaged Stefanie SANZ to confirm and get order placed.      Calvin Crow RN  12/03/21 [id number]

## 2021-12-03 NOTE — ED NOTES
Bed: ED-22  Expected date:   Expected time:   Means of arrival:   Comments:  Carla Calvillo RN  12/03/21 [id number]

## 2021-12-03 NOTE — ED NOTES
Bed: ED-28  Expected date:   Expected time:   Means of arrival:   Comments:   Ed 22       Nayeli Xiao RN  12/03/21 0914

## 2021-12-03 NOTE — H&P
History and Physical      Name:  Iliana Horton /Age/Sex: 1955  (74 y.o. female)   MRN & CSN:  [id number] & [id number] Admission Date/Time: 2021  8:02 PM   Location:  [facility] PCP: Bess Oneill MD       Hospital Day: 1    Assessment and Plan:   Iliana Horton is a 74 y.o.  female  who presents with <principal problem not specified>    #  Chest pain  History of DVT, currently off Xarelto. Unable to perform CTA because of allergy to IV dye. Pending VQ scan. Outpatient ultrasound to right leg was reportedly negative for DVT. Also has risk factor for ACS, needed chest pain rule out. First set of troponin negative, EKG no acute ST-T change. Continue trending troponin, telemetry monitoring, EKG as needed. Echocardiogram ordered. Cardiology consult for stress test.  Aspirin ordered. Lipid panel in the morning. #  Other chronic comorbidities:  -History of DVT, received treatment for Xarelto, currently off Xarelto. -Prediabetic, not on medication or insulin at home, continue diet control.  -Obesity, weight control discussed with patient. -CHF, euvolemic on physical exam.  -CKD stage III, creatinine at the baseline, continue monitoring.  -Hypertension, BP controlled, continue home medication. -Asthma, no evidence of acute exacerbation, continue home medication. Case discussed with ED provider and attending. Diet No diet orders on file   DVT Prophylaxis [x] Lovenox, []  Heparin, [] SCDs, [] Ambulation   GI Prophylaxis [x] PPI,  [] H2 Blocker,  [] Carafate,  [] Diet/Tube Feeds   Code Status Prior   Disposition Patient requires continued admission due to chest pain   MDM [] Low, [x] Moderate,[]  High  Patient's risk as above due to chest pain     History of Present Illness:     Chief Complaint: <principal problem not specified>  Iliana Horton is a 74 y.o. female that presents for chest pain. Onset was prior travel, x3 days ago.   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 Mercedes Nelson but is not currently on anticoagulation therapy. States for the last several days she began feeling \"sick\" with shortness of breath and intermittent pleuritic and exertional chest pain. States that \"my chest feels funny\" but denying any palpitations. She also noted increased swelling pain and tightness in the right calf. She had an outpatient ultrasound of her right lower leg through PCP and was told that it was negative for signs for a blood clot however family doctor sent her to outpatient imaging center for CTA chest for PE rule out. However patient had reported an allergy to IV contrast dye including \"swelling itching and redness. \"  She was sent from imaging center to have PE rule out imaging studies through the emergency department. Denying any dizziness lightheadedness, hemoptysis. No other abdominal or urinary complaints. Denying any numbness or tingling into the lower extremities. Patient does state she has a history of hypertension, hyperlipidemia and diabetes, on oral antihyperglycemics only. She has not been seen by cardiologist in over 5 years, states she has never had a heart catheterization or stress test.  At time of evaluation, patient is denying any chest pain but states it \"comes and goes. \"     Ten point ROS reviewed negative, unless as noted above    Objective:   No intake or output data in the 24 hours ending 12/02/21 2156   Vitals:   Vitals:    12/02/21 1519   BP: (!) 139/36   Pulse: 59   Resp: 15   Temp: 97.7 °F (36.5 °C)   SpO2: 95%     Physical Exam:   GEN Awake female, sitting upright in bed in no apparent distress. Appears given age. EYES Pupils are equally round. No scleral erythema, discharge, or conjunctivitis. HENT Mucous membranes are moist. Oral pharynx without exudates, no evidence of thrush.   NECK Supple, no apparent thyromegaly or masses. RESP Clear to auscultation, no wheezes, rales or rhonchi. Symmetric chest movement while on room air. CARDIO/VASC S1/S2 auscultated. Regular rate without appreciable murmurs, rubs, or gallops. No JVD or carotid bruits. Peripheral pulses equal bilaterally and palpable. No peripheral edema. GI Abdomen is soft without significant tenderness, masses, or guarding. Bowel sounds are normoactive. Rectal exam deferred.  No costovertebral angle tenderness. Normal appearing external genitalia. Chiu catheter is not present. HEME/LYMPH No palpable cervical lymphadenopathy and no hepatosplenomegaly. No petechiae or ecchymoses. MSK No gross joint deformities. SKIN Normal coloration, warm, dry. NEURO Cranial nerves appear grossly intact, normal speech, no lateralizing weakness. PSYCH Awake, alert, oriented x 4. Affect appropriate. Past Medical History:      Past Medical History:   Diagnosis Date    Acid reflux     Arthritis     Asthma     CH[facility]congest[facility] failure) ([facility]     Patient dneies - states she does not have CHF.  CKD (chronic kidney disease) stage 3, GFR 30-59 ml/min ([facility])     Diabetes mellitus [facility]     DVT (deep venous thrombosis) ([facility]     Hypertension     Sleep apnea      PSHX:  has a past surgical history that includes keratoplasty; Hysterectomy; Appendectomy; Cholecystectomy; eye surgery; Colonoscopy; knee surgery; Tonsillectomy; back surgery; Dilation and curettage of uterus (x2); and Diallo-en-Y Gastric Bypass. Allergies: Allergies   Allergen Reactions    Dye [Iodides]     Invokana [Canagliflozin] Other (See Comments)    Liraglutide     Adhesive Tape Dermatitis     Tears her skin, skin red and irritated       FAM HX: family history is not on file.   Soc HX:   Social History     Socioeconomic History    Marital status:      Spouse name: Not on file    Number of children: Not on file    Years of education: Not on file    Highest education

## 2021-12-03 NOTE — ED NOTES
Discharge orders received. Discussed discharge orders with pt, pt agreeable. Reviewed discharge instructions with pt including any new, changed or discontinued medications. Pt has no questions at this time.         Jenn Pride RN  12/03/21 0196

## 2021-12-03 NOTE — CONSULTS
INPATIENT CARDIOLOGY CONSULT NOTE       Reason for consultation:  Chest Pain        Primary care physician: Elba Kebede MD      Dear No admitting provider for patient encounter. Thank you for the consult    Chief Complaint   Patient presents with    Shortness of Breath     sent by imaging center for study to be completed because patient is allergic to IVP dye that was ordered for ct of the chest to rule out blood clot       History of present illness:Natalya is a 74 y. o.year old who  presents with  Chief Complaint   Patient presents with    Shortness of Breath     sent by imaging center for study to be completed because patient is allergic to IVP dye that was ordered for ct of the chest to rule out blood clot      Patient is a 68-year-old female with prior medical history significant for DVT presents to the hospital to be evaluated for pulmonary embolism. Patient denies any chest pain  She denies any shortness of breath  She denies any palpitations    Cardiologist consulted to evaluate patient with chest pain however she denies any episodes of chest pain. EKG shows sinus bradycardia at 46 bpm no ischemic changes    DVT study of lower extremity on 11/30/2021 did not reveal any DVT    Past medical history:    has a past medical history of Acid reflux, Arthritis, Asthma, CHF (congestive heart failure) ([address] CKD (chronic kidney disease) stage 3, GFR 30-59 ml/min ([address] Diabetes mellitus ([address] DVT (deep venous thrombosis) ([address] Hypertension, and Sleep apnea. Past surgical history:   has a past surgical history that includes keratoplasty; Hysterectomy; Appendectomy; Cholecystectomy; eye surgery; Colonoscopy; knee surgery; Tonsillectomy; back surgery; Dilation and curettage of uterus (x2); and Diallo-en-Y Gastric Bypass. Social History:   reports that she has never smoked. She has never used smokeless tobacco. She reports that she does not drink alcohol and does not use drugs.   Family history:   no family history of CAD, STROKE of DM    Allergies   Allergen Reactions    Dye [Iodides]     Invokana [Canagliflozin] Other (See Comments)    Liraglutide     Adhesive Tape Dermatitis     Tears her skin, skin red and irritated       escitalopram (LEXAPRO) tablet 10 mg, Daily  fluticasone (FLONASE) 50 MCG/ACT nasal spray 1 spray, Daily  magnesium oxide (MAG-OX) tablet 400 mg, Daily  melatonin tablet 10 mg, Nightly  Naphazoline-Hydroxyprop Mcell 0.03-0.5 % SOLN 1 drop, 4x Daily  potassium bicarb-citric acid (EFFER-K) effervescent tablet 40 mEq, Daily  traZODone (DESYREL) tablet 50 mg, Nightly  nitroGLYCERIN (NITROSTAT) SL tablet 0.4 mg, Q5 Min PRN  aspirin chewable tablet 81 mg, Daily  sodium chloride flush 0.9 % injection 5-40 mL, 2 times per day  sodium chloride flush 0.9 % injection 5-40 mL, PRN  0.9 % sodium chloride infusion, PRN  enoxaparin (LOVENOX) injection 40 mg, Daily  ondansetron (ZOFRAN-ODT) disintegrating tablet 4 mg, Q8H PRN   Or  ondansetron (ZOFRAN) injection 4 mg, Q6H PRN  polyethylene glycol (GLYCOLAX) packet 17 g, Daily PRN  acetaminophen (TYLENOL) tablet 650 mg, Q6H PRN   Or  acetaminophen (TYLENOL) suppository 650 mg, Q6H PRN  budesonide-formoterol (SYMBICORT) 160-4.5 MCG/ACT inhaler 2 puff, BID        Current Facility-Administered Medications   Medication Dose Route Frequency Provider Last Rate Last Admin    escitalopram (LEXAPRO) tablet 10 mg  10 mg Oral Daily Beau Chalet, APRN - CNP        fluticasone (FLONASE) 50 MCG/ACT nasal spray 1 spray  1 spray Each Nostril Daily Beau Chalet, APRN - CNP        magnesium oxide (MAG-OX) tablet 400 mg  400 mg Oral Daily Beau Chalet, APRN - CNP        melatonin tablet 10 mg  10 mg Oral Nightly Beau Chalet, APRN - CNP        Naphazoline-Hydroxyprop Mcell 0.03-0.5 % SOLN 1 drop  1 drop Right Eye 4x Daily Beau Chalet, APRN - CNP        potassium bicarb-citric acid (EFFER-K) effervescent tablet 40 mEq  40 mEq Oral Daily Mando Esparza, APRN - CNP  traZODone (DESYREL) tablet 50 mg  50 mg Oral Nightly Rafi Chávez, APRN - CNP        nitroGLYCERIN (NITROSTAT) SL tablet 0.4 mg  0.4 mg SubLINGual Q5 Min PRN Rafi Chávez, APRN - CNP        aspirin chewable tablet 81 mg  81 mg Oral Daily Rafi Dykesax, APRN - CNP        sodium chloride flush 0.9 % injection 5-40 mL  5-40 mL IntraVENous 2 times per day Rafi Dykesax, APRN - CNP        sodium chloride flush 0.9 % injection 5-40 mL  5-40 mL IntraVENous PRN Rafi Dykesax, APRN - CNP        0.9 % sodium chloride infusion  25 mL IntraVENous PRN Rafi Dykesax, APRN - CNP        enoxaparin (LOVENOX) injection 40 mg  40 mg SubCUTAneous Daily Rafi Chávez, APRN - CNP        ondansetron (ZOFRAN-ODT) disintegrating tablet 4 mg  4 mg Oral Q8H PRN Rafi Chávez, APRN - CNP        Or    ondansetron (ZOFRAN) injection 4 mg  4 mg IntraVENous Q6H PRN Rafi Dykesax, APRN - CNP        polyethylene glycol (GLYCOLAX) packet 17 g  17 g Oral Daily PRN Rafi Dykesax, APRN - CNP        acetaminophen (TYLENOL) tablet 650 mg  650 mg Oral Q6H PRN Rafi Dykesax, APRN - CNP        Or    acetaminophen (TYLENOL) suppository 650 mg  650 mg Rectal Q6H PRN Rafi Dykesax, APRN - CNP        budesonide-formoterol (SYMBICORT) 160-4.5 MCG/ACT inhaler 2 puff  2 puff Inhalation BID Rafi Dykesax, APRN - CNP         Current Outpatient Medications   Medication Sig Dispense Refill    naphazoline-pheniramine (NAPHCON-A) 0.025-0.3 % ophthalmic solution Place 1 drop into the right eye 4 times daily 1 Bottle 0    escitalopram (LEXAPRO) 10 MG tablet Take 10 mg by mouth daily      Polysaccharide Iron Complex (PROFE PO) Take 180 mg by mouth      melatonin 10 MG CAPS capsule Take 10 mg by mouth nightly      Ascorbic Acid (VITAMIN C) 500 MG CAPS Take by mouth      traZODone (DESYREL) 50 MG tablet Take 50 mg by mouth nightly      Clotrimazole (ALEVAZOL) 1 % OINT Apply topically      Biotin 1000 MCG TABS Take 1 mg by mouth      fluticasone (FLONASE) 50 MCG/ACT nasal spray 1 spray by Nasal route daily as needed      calcium carbonate (OSCAL) 500 MG TABS tablet Take 2 tablets by mouth      Cyanocobalamin (VITAMIN B-12) 2500 MCG SUBL Place 1 each under the tongue      magnesium oxide (MAG-OX) 400 MG tablet Take 400 mg by mouth daily      naproxen (NAPROSYN) 500 MG tablet Take 1 tablet by mouth 2 times daily 10 tablet 0    zinc gluconate 50 MG tablet Take 50 mg by mouth daily      Multiple Vitamins-Minerals (ALIVE WOMENS GUMMY) CHEW Take 2 tablets by mouth daily      calcium carbonate (OSCAL) 500 MG TABS tablet Take 500 mg by mouth daily      B Complex Vitamins (VITAMIN B COMPLEX PO) Take by mouth      potassium chloride (KLOR-CON) 20 MEQ packet Take 40 mEq by mouth daily.  fluticasone-salmeterol (ADVAIR) 250-50 MCG/DOSE AEPB Inhale 1 puff into the lungs every 12 hours.  Rivaroxaban (XARELTO PO) Take 20 mg by mouth daily.  vitamin D (ERGOCALCIFEROL) 55124 UNITS CAPS capsule Take 10,000 Units by mouth Twice a Week            Review of Systems:     · Constitutional: No Fever or Weight Loss   · Eyes: No Decreased Vision  · ENT: No Headaches, Hearing Loss or Vertigo  · Cardiovascular: 0 chest pain,  0 dyspnea on exertion, no palpitations or loss of consciousness  · Respiratory: No cough or wheezing    · Gastrointestinal: No abdominal pain, appetite loss, blood in stools, constipation, diarrhea or heartburn  · Genitourinary: No dysuria, trouble voiding, or hematuria  · Musculoskeletal:  No gait disturbance, weakness or joint complaints  · Integumentary: No rash or pruritis  · Neurological: No TIA or stroke symptoms  · Psychiatric: No anxiety or depression  · Endocrine: No malaise, fatigue or temperature intolerance  · Hematologic/Lymphatic: No bleeding problems, blood clots or swollen lymph nodes  · Allergic/Immunologic: No nasal congestion or hives    All other systems were reviewed and were negative otherwise.          Physical Examination: Vitals:    12/03/21 0317   BP: (!) 140/47   Pulse: 51   Resp: 18   Temp: 98.7 °F (37.1 °C)   SpO2: 98%      Wt Readings from Last 3 Encounters:   12/02/21 190 lb (86.2 kg)   11/12/21 190 lb (86.2 kg)   05/31/21 201 lb (91.2 kg)     Body mass index is 34.2 kg/m². General Appearance:  No distress, conversant  Constitutional:  Well developed, Well nourished  HEENT:  Normocephalic, Atraumatic, Oropharynx moist, No oral exudates,   Nose normal. Neck Supple Carotid: no carotid bruit  Eyes:  Conjunctiva normal, No discharge. Respiratory:    Normal breath sounds, No respiratory distress, No wheezing, no use of accessory muscles, diaphragm movement is normal  No chest Tenderness  Cardiovascular: S1-S2 No murmurs auscultated. No rubs, thrills or gallops. Normal  rhythm. Pedal pulses are normal. No pedal edema  GI:  Soft Non tender, non distended. :  No CVA tenderness. Musculoskeletal:   No tenderness, No cyanosis, No clubbing. Integument:  Warm, Dry, No erythema, No rash. Lymphatic:  No lymphadenopathy noted. Neurologic:  Alert & oriented x 3  No focal deficits noted. Psychiatric:  Affect normal, Judgment normal, Mood normal.       Lab Review     Recent Labs     12/02/21  1821   WBC 8.1   HGB 14.6   HCT 45.2         Recent Labs     12/02/21  1821      K 4.8      CO2 29   BUN 20   CREATININE 1.2*     Recent Labs     12/02/21  1821   AST 22   ALT 20   BILITOT 0.3   ALKPHOS 156*     No results for input(s): TROPONINI in the last 72 hours. Lab Results   Component Value Date    BNP 25.2 08/12/2013    BNP 26.0 04/29/2013    .3 (H) 07/06/2011     Lab Results   Component Value Date    INR 0.84 10/11/2021    PROTIME 10.5 (L) 10/11/2021         All labs, images, EKGs were personally reviewed      Assessment: 74 y. o.year old with PMH of  has a past medical history of Acid reflux, Arthritis, Asthma, CHF (congestive heart failure) ([address] CKD (chronic kidney disease) stage 3, GFR 30-59 ml/min ([address] Diabetes mellitus ([address] DVT (deep venous thrombosis) ([address] Hypertension, and Sleep apnea. Recommendations:      1. ? Chest Pain    Patient denies any chest pain  Denies shortness of breath or palpitations  EKG shows sinus bradycardia no ischemic changes  Troponins are negative  No further work-up planned  Echo was ordered by primary team will follow. 2. History of DVT: Previously on Xarelto, currently off. 3. CKD: Stable. 4. Hypertension. Blood pressure stable.   Low-salt low-fat diet    No further cardiac work-up  Please call us with questions     Thank you for the consult    Dr. Kirit Guzman  12/3/2021 8:41 AM

## 2021-12-03 NOTE — ED NOTES
XR CHEST (2 VW)    Status: Final result       Order Providers    Authorizing Billing   MD Jena Person MD            Signed by    Signed Date/Time Phone Pager   Michelle Route 12/02/2021  5:36 [phone]      Reading Providers    Read Date Phone Pager   [facility] Thu Dec 2, 2021  5:36 [phone]        XR CHEST (2 VW): Patient Communication     Not Released  Not seen     Radiation Dose Estimates    No radiation information found for this patient  Narrative   EXAMINATION:   TWO XRAY VIEWS OF THE CHEST       12/2/2021 2:16 pm       COMPARISON:   02/18/2016       HISTORY:   ORDERING SYSTEM PROVIDED HISTORY: shortness of breath   TECHNOLOGIST PROVIDED HISTORY:   Reason for exam:->shortness of breath   Reason for Exam: shortness of breath   Acuity: Unknown   Type of Exam: Unknown       FINDINGS:   Mild elevation of the right hemidiaphragm is unchanged.  No acute focal   infiltrate is identified.  The cardial pericardial silhouette is stable.  No   pneumothorax is identified.  No free air.  No acute bony abnormality detected.           Impression   No acute abnormality identified.           Sea Braswell RN  12/03/21 [id number]

## 2022-01-04 ENCOUNTER — OFFICE VISIT (OUTPATIENT)
Dept: PULMONOLOGY | Age: 67
End: 2022-01-04
Payer: COMMERCIAL

## 2022-01-04 VITALS
HEART RATE: 85 BPM | OXYGEN SATURATION: 94 % | HEIGHT: 63 IN | SYSTOLIC BLOOD PRESSURE: 126 MMHG | WEIGHT: 190 LBS | DIASTOLIC BLOOD PRESSURE: 68 MMHG | BODY MASS INDEX: 33.66 KG/M2

## 2022-01-04 DIAGNOSIS — G47.10 HYPERSOMNIA: ICD-10-CM

## 2022-01-04 DIAGNOSIS — E66.9 OBESITY (BMI 30.0-34.9): ICD-10-CM

## 2022-01-04 DIAGNOSIS — G47.33 OSA (OBSTRUCTIVE SLEEP APNEA): ICD-10-CM

## 2022-01-04 PROCEDURE — 1090F PRES/ABSN URINE INCON ASSESS: CPT | Performed by: INTERNAL MEDICINE

## 2022-01-04 PROCEDURE — G8427 DOCREV CUR MEDS BY ELIG CLIN: HCPCS | Performed by: INTERNAL MEDICINE

## 2022-01-04 PROCEDURE — G8417 CALC BMI ABV UP PARAM F/U: HCPCS | Performed by: INTERNAL MEDICINE

## 2022-01-04 PROCEDURE — G8484 FLU IMMUNIZE NO ADMIN: HCPCS | Performed by: INTERNAL MEDICINE

## 2022-01-04 PROCEDURE — 99203 OFFICE O/P NEW LOW 30 MIN: CPT | Performed by: INTERNAL MEDICINE

## 2022-01-04 ASSESSMENT — ENCOUNTER SYMPTOMS
COUGH: 0
ABDOMINAL DISTENTION: 0
BACK PAIN: 0
ABDOMINAL PAIN: 0
EYE ITCHING: 0
EYE DISCHARGE: 0
SHORTNESS OF BREATH: 0

## 2022-01-04 NOTE — PROGRESS NOTES
Cesar Schultz  1955  Referring Love Hooker MD    Subjective:     Chief Complaint   Patient presents with    Sleep Apnea     non-compliance        HPI  Bard Puentes is a 74 y.o. female has come back as a follow up. She has a MARQUISE on a CPAP of 10 cm h20. She has used it for 1 day for more than 4 hours. She says the pressure is too much. She has a nasal mask. She has gained about 20 lbs. She is less sleepy during the day time    Current Outpatient Medications   Medication Sig Dispense Refill    naphazoline-pheniramine (NAPHCON-A) 0.025-0.3 % ophthalmic solution Place 1 drop into the right eye 4 times daily 1 Bottle 0    escitalopram (LEXAPRO) 10 MG tablet Take 10 mg by mouth daily      Polysaccharide Iron Complex (PROFE PO) Take 180 mg by mouth      melatonin 10 MG CAPS capsule Take 10 mg by mouth nightly      Ascorbic Acid (VITAMIN C) 500 MG CAPS Take by mouth      traZODone (DESYREL) 50 MG tablet Take 50 mg by mouth nightly      Clotrimazole (ALEVAZOL) 1 % OINT Apply topically      Biotin 1000 MCG TABS Take 1 mg by mouth      fluticasone (FLONASE) 50 MCG/ACT nasal spray 1 spray by Nasal route daily as needed      calcium carbonate (OSCAL) 500 MG TABS tablet Take 2 tablets by mouth      Cyanocobalamin (VITAMIN B-12) 2500 MCG SUBL Place 1 each under the tongue      magnesium oxide (MAG-OX) 400 MG tablet Take 400 mg by mouth daily      naproxen (NAPROSYN) 500 MG tablet Take 1 tablet by mouth 2 times daily 10 tablet 0    zinc gluconate 50 MG tablet Take 50 mg by mouth daily      Multiple Vitamins-Minerals (ALIVE WOMENS GUMMY) CHEW Take 2 tablets by mouth daily      B Complex Vitamins (VITAMIN B COMPLEX PO) Take by mouth      potassium chloride (KLOR-CON) 20 MEQ packet Take 40 mEq by mouth daily.  fluticasone-salmeterol (ADVAIR) 250-50 MCG/DOSE AEPB Inhale 1 puff into the lungs every 12 hours.  Rivaroxaban (XARELTO PO) Take 20 mg by mouth daily.       vitamin D (ERGOCALCIFEROL) 60317 UNITS CAPS capsule Take 10,000 Units by mouth Twice a Week        No current facility-administered medications for this visit. Allergies   Allergen Reactions    Dye [Iodides]     Invokana [Canagliflozin] Other (See Comments)    Liraglutide     Adhesive Tape Dermatitis     Tears her skin, skin red and irritated       Past Medical History:   Diagnosis Date    Acid reflux     Arthritis     Asthma     CHF (congestive heart failure) ([facility]     Patient dneies - states she does not have CHF.  CKD (chronic kidney disease) stage 3, GFR 30-59 ml/min ([facility]     [facility]     DVT (deep venous thrombosis) ([facility]     Hypertension     Sleep apnea        Past Surgical History:   Procedure Laterality Date    APPENDECTOMY      BACK SURGERY      neck x4    CHOLECYSTECTOMY      COLONOSCOPY      DILATION AND CURETTAGE OF UTERUS  x2    EYE SURGERY      bilateral cataracts    HYSTERECTOMY      KERATOPLASTY      KNEE SURGERY      both legs    AYAN-EN-Y GASTRIC BYPASS      TONSILLECTOMY         Social History     Socioeconomic History    Marital status:      Spouse name: None    Number of children: None    Years of education: None    Highest education level: None   Occupational History    None   Tobacco Use    Smoking status: Never Smoker    Smokeless tobacco: Never Used   Substance and Sexual Activity    Alcohol use: No    Drug use: No    Sexual activity: None   Other Topics Concern    None   Social History Narrative    None     Social Determinants of Health     Financial Resource Strain:     Difficulty of Paying Living Expenses: Not on file   Food Insecurity:     Worried About Running Out of Food in the Last Year: Not on file    Rainer of Food in the Last Year: Not on file   Transportation Needs:     Lack of Transportation (Medical): Not on file    Lack of Transportation (Non-Medical):  Not on file   Physical Activity:     Days of Exercise per Week: Not on file    Minutes of Exercise per Session: Not on file   Stress:     Feeling of Stress : Not on file   Social Connections:     Frequency of Communication with Friends and Family: Not on file    Frequency of Social Gatherings with Friends and Family: Not on file    Attends Yazdanism Services: Not on file    Active Member of Clubs or Organizations: Not on file    Attends Club or Organization Meetings: Not on file    Marital Status: Not on file   Intimate Partner Violence:     Fear of Current or Ex-Partner: Not on file    Emotionally Abused: Not on file    Physically Abused: Not on file    Sexually Abused: Not on file   Housing Stability:     Unable to Pay for Housing in the Last Year: Not on file    Number of [place] in the Last Year: Not on file    Unstable Housing in the Last Year: Not on file       Review of Systems   Constitutional: Positive for fatigue. HENT: Negative for congestion and postnasal drip. Eyes: Negative for discharge and itching. Respiratory: Negative for cough and shortness of breath. Cardiovascular: Negative for chest pain and leg swelling. Gastrointestinal: Negative for abdominal distention and abdominal pain. Endocrine: Negative for cold intolerance and heat intolerance. Genitourinary: Negative for enuresis and frequency. Musculoskeletal: Negative for arthralgias and back pain. Allergic/Immunologic: Negative for environmental allergies and food allergies. Neurological: Negative for light-headedness and headaches. Hematological: Negative for adenopathy. Psychiatric/Behavioral: Negative for agitation and behavioral problems. Objective:   /68   Pulse 85   Ht 5' 2.5\" (1.588 m)   Wt 190 lb (86.2 kg)   SpO2 94%   BMI 34.20 kg/m²   Body mass index is 34.2 kg/m².   Sleep Medicine 4/19/2021 4/1/2021 6/29/2018 6/14/2018 6/14/2018   Sitting and reading 3 3 2 2 -   Watching TV 2 2 2 1 -   Sitting, inactive in a public place (e.g. a theatre or a meeting) 0 0 2 1 -   As a passenger in a car for an hour without a break 2 2 0 2 -   Lying down to rest in the afternoon when circumstances permit 0 0 2 3 -   Sitting and talking to someone 2 2 2 1 -   Sitting quietly after a lunch without alcohol 2 2 1 1 -   In a car, while stopped for a few minutes in traffic 0 0 0 0 -   Total score 11 11 11 11 -   Neck circumference 13.5 - - 13 13     Mallampati 3    Physical Exam  Vitals reviewed. Constitutional:       Appearance: Normal appearance. Comments: Obesity   HENT:      Head: Normocephalic and atraumatic. Nose: Nose normal.      Mouth/Throat:      Mouth: Mucous membranes are moist.   Eyes:      Extraocular Movements: Extraocular movements intact. Pupils: Pupils are equal, round, and reactive to light. Cardiovascular:      Rate and Rhythm: Normal rate and regular rhythm. Pulses: Normal pulses. Heart sounds: Normal heart sounds. Pulmonary:      Effort: Pulmonary effort is normal.      Breath sounds: Normal breath sounds. Abdominal:      General: Abdomen is flat. Palpations: Abdomen is soft. Musculoskeletal:         General: Normal range of motion. Cervical back: Normal range of motion and neck supple. Skin:     General: Skin is warm and dry. Neurological:      General: No focal deficit present. Mental Status: She is alert and oriented to person, place, and time. Psychiatric:         Mood and Affect: Mood normal.         Behavior: Behavior normal.         Radiology: None    Assessment and Plan     Problem List        Pulmonary Problems    MARQUISE (obstructive sleep apnea)      Since she has gained 20 lbs and also feels the pressure is too much  I'll put her on a Auto CPAP  Loose weight  Need 2 week download data         Relevant Orders    DME Order for CPAP as OP       Other    Obesity (BMI 30.0-34. 9)      Advised to loose weight with diet and exercise           Hypersomnia      Advised to be compliant with the Auto CPAP  Loose weight  Need 2 week download data                    Follow-Up:    Return in about 2 months (around 3/4/2022) for 2 week download data.      Progress notes sent to the referring Provider    Zuleika Shah MD MD  1/4/2022  9:59 AM

## 2022-01-04 NOTE — ASSESSMENT & PLAN NOTE
Since she has gained 20 lbs and also feels the pressure is too much  I'll put her on a Auto CPAP  Loose weight  Need 2 week download data

## 2022-03-08 ENCOUNTER — OFFICE VISIT (OUTPATIENT)
Dept: PULMONOLOGY | Age: 67
End: 2022-03-08
Payer: COMMERCIAL

## 2022-03-08 VITALS
OXYGEN SATURATION: 97 % | SYSTOLIC BLOOD PRESSURE: 134 MMHG | HEIGHT: 63 IN | BODY MASS INDEX: 34.38 KG/M2 | WEIGHT: 194 LBS | DIASTOLIC BLOOD PRESSURE: 60 MMHG | HEART RATE: 59 BPM

## 2022-03-08 DIAGNOSIS — G47.33 OSA (OBSTRUCTIVE SLEEP APNEA): ICD-10-CM

## 2022-03-08 DIAGNOSIS — G47.10 HYPERSOMNIA: ICD-10-CM

## 2022-03-08 DIAGNOSIS — E66.9 OBESITY (BMI 30.0-34.9): ICD-10-CM

## 2022-03-08 PROCEDURE — 99213 OFFICE O/P EST LOW 20 MIN: CPT | Performed by: INTERNAL MEDICINE

## 2022-03-08 PROCEDURE — G8484 FLU IMMUNIZE NO ADMIN: HCPCS | Performed by: INTERNAL MEDICINE

## 2022-03-08 PROCEDURE — G8427 DOCREV CUR MEDS BY ELIG CLIN: HCPCS | Performed by: INTERNAL MEDICINE

## 2022-03-08 PROCEDURE — 1090F PRES/ABSN URINE INCON ASSESS: CPT | Performed by: INTERNAL MEDICINE

## 2022-03-08 PROCEDURE — G8417 CALC BMI ABV UP PARAM F/U: HCPCS | Performed by: INTERNAL MEDICINE

## 2022-03-08 ASSESSMENT — ENCOUNTER SYMPTOMS
ABDOMINAL PAIN: 0
EYE ITCHING: 0
ABDOMINAL DISTENTION: 0
EYE DISCHARGE: 0
SHORTNESS OF BREATH: 0
COUGH: 0
BACK PAIN: 0

## 2022-03-08 NOTE — PROGRESS NOTES
Ndiaye Alfredo  1955  Referring Provider: Giorgi Hedrick MD    Subjective:     Chief Complaint   Patient presents with    Sleep Apnea     compliance        HPI  Lima Valles is a 74 y.o. female has come back as a follow up. She has MARQUISE on a Auto CPAP which she is has used it for 2/30 days for more than 4 hours (7%). She says that she is sleeping on the chair and forgetting to put the CPAP on. She has 15 lbs weight gain. She is still sleepy and tired during the day time. She has a nasal pillows mask. Her 2 week download data showed a residual AHI is 5.7 and leak is 30.1 L/min. Her 95th percentile pressure is 8.7 cm h20.      Current Outpatient Medications   Medication Sig Dispense Refill    WARFARIN SODIUM PO Take by mouth      naphazoline-pheniramine (NAPHCON-A) 0.025-0.3 % ophthalmic solution Place 1 drop into the right eye 4 times daily 1 Bottle 0    escitalopram (LEXAPRO) 10 MG tablet Take 10 mg by mouth daily      Polysaccharide Iron Complex (PROFE PO) Take 180 mg by mouth      melatonin 10 MG CAPS capsule Take 10 mg by mouth nightly      Ascorbic Acid (VITAMIN C) 500 MG CAPS Take by mouth      traZODone (DESYREL) 50 MG tablet Take 50 mg by mouth nightly      Clotrimazole (ALEVAZOL) 1 % OINT Apply topically      Biotin 1000 MCG TABS Take 1 mg by mouth      fluticasone (FLONASE) 50 MCG/ACT nasal spray 1 spray by Nasal route daily as needed      calcium carbonate (OSCAL) 500 MG TABS tablet Take 2 tablets by mouth      Cyanocobalamin (VITAMIN B-12) 2500 MCG SUBL Place 1 each under the tongue      magnesium oxide (MAG-OX) 400 MG tablet Take 400 mg by mouth daily      naproxen (NAPROSYN) 500 MG tablet Take 1 tablet by mouth 2 times daily 10 tablet 0    zinc gluconate 50 MG tablet Take 50 mg by mouth daily      Multiple Vitamins-Minerals (ALIVE WOMENS GUMMY) CHEW Take 2 tablets by mouth daily      B Complex Vitamins (VITAMIN B COMPLEX PO) Take by mouth      potassium chloride (KLOR-CON) 20 MEQ packet Take 40 mEq by mouth daily.  fluticasone-salmeterol (ADVAIR) 250-50 MCG/DOSE AEPB Inhale 1 puff into the lungs every 12 hours.  Rivaroxaban (XARELTO PO) Take 20 mg by mouth daily.  vitamin D (ERGOCALCIFEROL) 01958 UNITS CAPS capsule Take 10,000 Units by mouth Twice a Week        No current facility-administered medications for this visit. Allergies   Allergen Reactions    Dye [Iodides]     Invokana [Canagliflozin] Other (See Comments)    Liraglutide     Adhesive Tape Dermatitis     Tears her skin, skin red and irritated       Past Medical History:   Diagnosis Date    Acid reflux     Arthritis     Asthma     [facility]     Patient dneies - states she does not have CHF.     CKD (chronic kidney disease) stage 3, GFR 30-59 ml/min [facility]     Diabetes mellitus ([facility]     DVT (deep venous thrombosis) ([facility]     Hypertension     Sleep apnea        Past Surgical History:   Procedure Laterality Date    APPENDECTOMY      BACK SURGERY      neck x4    CHOLECYSTECTOMY      COLONOSCOPY      DILATION AND CURETTAGE OF UTERUS  x2    EYE SURGERY      bilateral cataracts    HYSTERECTOMY      KERATOPLASTY      KNEE SURGERY      both legs    AYAN-EN-Y GASTRIC BYPASS      TONSILLECTOMY         Social History     Socioeconomic History    Marital status:      Spouse name: None    Number of children: None    Years of education: None    Highest education level: None   Occupational History    None   Tobacco Use    Smoking status: Never Smoker    Smokeless tobacco: Never Used   Substance and Sexual Activity    Alcohol use: No    Drug use: No    Sexual activity: None   Other Topics Concern    None   Social History Narrative    None     Social Determinants of Health     Financial Resource Strain:     Difficulty of Paying Living Expenses: Not on file   Food Insecurity:     Worried About Running Out of Food in the Last Year: Not on file    Rainer avalos Food in the Last Year: Not on file   Transportation Needs:     Lack of Transportation (Medical): Not on file    Lack of Transportation (Non-Medical): Not on file   Physical Activity:     Days of Exercise per Week: Not on file    Minutes of Exercise per Session: Not on file   Stress:     Feeling of Stress : Not on file   Social Connections:     Frequency of Communication with Friends and Family: Not on file    Frequency of Social Gatherings with Friends and Family: Not on file    Attends Confucianist Services: Not on file    Active Member of [address] [place] [address] [employer] or Organizations: Not on file    Attends Club or Organization Meetings: Not on file    Marital Status: Not on file   Intimate Partner Violence:     Fear of Current or Ex-Partner: Not on file    Emotionally Abused: Not on file    Physically Abused: Not on file    Sexually Abused: Not on file   Housing Stability:     Unable to Pay for Housing in the Last Year: Not on file    Number of [place] in the Last Year: Not on file    Unstable Housing in the Last Year: Not on file       Review of Systems   Constitutional: Positive for fatigue. HENT: Negative for congestion and postnasal drip. Eyes: Negative for discharge and itching. Respiratory: Negative for cough and shortness of breath. Cardiovascular: Negative for chest pain and leg swelling. Gastrointestinal: Negative for abdominal distention and abdominal pain. Endocrine: Negative for cold intolerance and heat intolerance. Genitourinary: Negative for dysuria and frequency. Musculoskeletal: Negative for arthralgias and back pain. Allergic/Immunologic: Negative for environmental allergies and food allergies. Neurological: Negative for light-headedness and headaches. Hematological: Negative for adenopathy. Psychiatric/Behavioral: Negative for agitation and behavioral problems.        Objective:   /60   Pulse 59   Ht 5' 2.5\" (1.588 m)   Wt 194 lb (88 kg)   SpO2 97%   BMI 34.92 kg/m²   Body mass index is 34.92 kg/m². Sleep Medicine 4/19/2021 4/1/2021 6/29/2018 6/14/2018 6/14/2018   Sitting and reading 3 3 2 2 -   Watching TV 2 2 2 1 -   Sitting, inactive in a public place (e.g. a theatre or a meeting) 0 0 2 1 -   As a passenger in a car for an hour without a break 2 2 0 2 -   Lying down to rest in the afternoon when circumstances permit 0 0 2 3 -   Sitting and talking to someone 2 2 2 1 -   Sitting quietly after a lunch without alcohol 2 2 1 1 -   In a car, while stopped for a few minutes in traffic 0 0 0 0 -   Total score 11 11 11 11 -   Neck circumference (Inches) 13.5 - - 13 13     Mallampati 3    Physical Exam  Vitals reviewed. Constitutional:       Appearance: Normal appearance. Comments: Obesity   HENT:      Head: Normocephalic and atraumatic. Nose: Nose normal.      Mouth/Throat:      Mouth: Mucous membranes are moist.   Eyes:      Extraocular Movements: Extraocular movements intact. Pupils: Pupils are equal, round, and reactive to light. Cardiovascular:      Rate and Rhythm: Normal rate and regular rhythm. Pulses: Normal pulses. Heart sounds: Normal heart sounds. Pulmonary:      Effort: Pulmonary effort is normal.      Breath sounds: Normal breath sounds. Abdominal:      General: Abdomen is flat. Palpations: Abdomen is soft. Musculoskeletal:         General: Normal range of motion. Cervical back: Normal range of motion and neck supple. Skin:     General: Skin is warm and dry. Neurological:      General: No focal deficit present. Mental Status: She is alert and oriented to person, place, and time.    Psychiatric:         Mood and Affect: Mood normal.         Behavior: Behavior normal.         Radiology: None    Assessment and Plan     Problem List        Pulmonary Problems    MARQUISE (obstructive sleep apnea)      Advised to be compliant with the CPAP  Loose weight         Relevant Orders    Misc nursing order (specify)       Other Obesity (BMI 30.0-34. 9)      Advised to loose weight with diet and exercise           Hypersomnia      Advised to be compliant with the CPAP  Loose weight                    Follow-Up:    Return in about 2 months (around 5/8/2022) for 2 week download data.      Progress notes sent to the referring Provider    Gosia Calzada MD MD  3/8/2022  9:57 AM

## 2022-06-25 ENCOUNTER — HOSPITAL ENCOUNTER (EMERGENCY)
Age: 67
Discharge: HOME | End: 2022-06-25
Attending: EMERGENCY MEDICINE
Payer: MEDICARE

## 2022-06-25 VITALS
BODY MASS INDEX: 33.66 KG/M2 | HEIGHT: 63 IN | TEMPERATURE: 98.1 F | WEIGHT: 190 LBS | RESPIRATION RATE: 18 BRPM | DIASTOLIC BLOOD PRESSURE: 60 MMHG | OXYGEN SATURATION: 99 % | HEART RATE: 62 BPM | SYSTOLIC BLOOD PRESSURE: 128 MMHG
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
## 2022-06-25 PROCEDURE — 99283 EMERGENCY DEPT VISIT LOW MDM: CPT

## 2022-06-25 PROCEDURE — 6370000000 HC RX 637 (ALT 250 FOR IP): Performed by: EMERGENCY MEDICINE

## 2022-06-25 RX ORDER — PREDNISONE 20 MG/1
40 TABLET ORAL ONCE
Status: COMPLETED | OUTPATIENT
Start: 2022-06-25 | End: 2022-06-25

## 2022-06-25 RX ORDER — METHYLPREDNISOLONE 4 MG/1
TABLET ORAL
Qty: 1 KIT | Refills: 0 | Status: SHIPPED | OUTPATIENT
Start: 2022-06-25

## 2022-06-25 RX ORDER — DIAPER,BRIEF,INFANT-TODD,DISP
EACH MISCELLANEOUS
Qty: 28 G | Refills: 0 | Status: SHIPPED | OUTPATIENT
Start: 2022-06-25

## 2022-06-25 RX ORDER — DIPHENHYDRAMINE HCL 25 MG
50 CAPSULE ORAL ONCE
Status: COMPLETED | OUTPATIENT
Start: 2022-06-25 | End: 2022-06-25

## 2022-06-25 RX ADMIN — DIPHENHYDRAMINE HYDROCHLORIDE 50 MG: 25 CAPSULE ORAL at 15:34

## 2022-06-25 RX ADMIN — PREDNISONE 40 MG: 20 TABLET ORAL at 16:14

## 2022-06-25 ASSESSMENT — PAIN - FUNCTIONAL ASSESSMENT: PAIN_FUNCTIONAL_ASSESSMENT: NONE - DENIES PAIN

## 2022-06-25 NOTE — ED PROVIDER NOTES
Emergency Department Encounter  Location: [facility] [address] [place] [address]    Patient: Dominik Pandey  MRN: [id number]  : 1955  Date of evaluation: 2022  ED Provider: Shannan Beavers DO, FACEP    Chief Complaint:    Insect Bite (unsure of which type of insect. Patient has bite on right arm. No dyspnea or s/s of allergic reaction. )    [place]:  Dominik Pandey is a 74 y.o. female that presents to the emergency department complaints of an insect bite or sting on her left forearm. The patient states she did not get a view of what it was that bit or stung her but she had stinging in the area of her right elbow and now she is having itchiness. She denies any difficulty swallowing or difficulty breathing. She states she was feeling very jittery when it first occurred but is doing a little better now. It occurred about 30 minutes ago. She presents to the emergency department with these complaints. The patient does not have an allergy to bees or wasp stings. ROS:  At least 4 systems reviewed and otherwise acutely negative except as in the [address] Negative for fever or chills  Negative for chest pain  Negative for shortness of breath  Negative for nausea vomiting diarrhea or constipation    Past Medical History:   Diagnosis Date    Acid reflux     Arthritis     Asthma     CHF (congestive heart failure) ([facility])     Patient dneies - states she does not have CHF.     CKD (chronic kidney disease) stage 3, GFR 30-59 ml/min ([facility])     Diabetes mellitus ([address]     DVT (deep venous thrombosis) ([address]     Hypertension     Sleep apnea      Past Surgical History:   Procedure Laterality Date    APPENDECTOMY      BACK SURGERY      neck x4    CHOLECYSTECTOMY      COLONOSCOPY      DILATION AND CURETTAGE OF UTERUS  x2    EYE SURGERY      bilateral cataracts    HYSTERECTOMY      KERATOPLASTY      KNEE SURGERY      both legs    AYAN-EN-Y GASTRIC BYPASS      TONSILLECTOMY       No family history on file.  Social History     Socioeconomic History    Marital status:      Spouse name: Not on file    Number of children: Not on file    Years of education: Not on file    Highest education level: Not on file   Occupational History    Not on file   Tobacco Use    Smoking status: Never Smoker    Smokeless tobacco: Never Used   Substance and Sexual Activity    Alcohol use: No    Drug use: No    Sexual activity: Not on file   Other Topics Concern    Not on file   Social History Narrative    Not on file     Social Determinants of Health     Financial Resource Strain:     Difficulty of Paying Living Expenses: Not on file   Food Insecurity:     Worried About Running Out of Food in the Last Year: Not on file    Rainer of Food in the Last Year: Not on file   Transportation Needs:     Lack of Transportation (Medical): Not on file    Lack of Transportation (Non-Medical):  Not on file   Physical Activity:     Days of Exercise per Week: Not on file    Minutes of Exercise per Session: Not on file   Stress:     Feeling of Stress : Not on file   Social Connections:     Frequency of Communication with Friends and Family: Not on file    Frequency of Social Gatherings with Friends and Family: Not on file    Attends Mormonism Services: Not on file    Active Member of [address] [place] [address] [employer] or Organizations: Not on file    Attends Club or Organization Meetings: Not on file    Marital Status: Not on file   Intimate Partner Violence:     Fear of Current or Ex-Partner: Not on file    Emotionally Abused: Not on file    Physically Abused: Not on file    Sexually Abused: Not on file   Housing Stability:     Unable to Pay for Housing in the Last Year: Not on file    Number of [place] in the Last Year: Not on file    Unstable Housing in the Last Year: Not on file     Current Facility-Administered Medications   Medication Dose Route Frequency Provider Last Rate Last Admin    predniSONE (DELTASONE) tablet 40 mg  40 mg Oral Once Bill Givens, DO         Current Outpatient Medications   Medication Sig Dispense Refill    methylPREDNISolone (MEDROL, TOÑO,) 4 MG tablet Take by mouth. 1 kit 0    hydrocortisone (V-R HYDROCORTISONE/ALOE) 0.5 % ointment Apply topically 2 times daily. 28 g 0    WARFARIN SODIUM PO Take by mouth      naphazoline-pheniramine (NAPHCON-A) 0.025-0.3 % ophthalmic solution Place 1 drop into the right eye 4 times daily 1 Bottle 0    escitalopram (LEXAPRO) 10 MG tablet Take 10 mg by mouth daily      Polysaccharide Iron Complex (PROFE PO) Take 180 mg by mouth      melatonin 10 MG CAPS capsule Take 10 mg by mouth nightly      Ascorbic Acid (VITAMIN C) 500 MG CAPS Take by mouth      traZODone (DESYREL) 50 MG tablet Take 50 mg by mouth nightly      Clotrimazole (ALEVAZOL) 1 % OINT Apply topically      Biotin 1000 MCG TABS Take 1 mg by mouth      fluticasone (FLONASE) 50 MCG/ACT nasal spray 1 spray by Nasal route daily as needed      calcium carbonate (OSCAL) 500 MG TABS tablet Take 2 tablets by mouth      Cyanocobalamin (VITAMIN B-12) 2500 MCG SUBL Place 1 each under the tongue      magnesium oxide (MAG-OX) 400 MG tablet Take 400 mg by mouth daily      naproxen (NAPROSYN) 500 MG tablet Take 1 tablet by mouth 2 times daily 10 tablet 0    zinc gluconate 50 MG tablet Take 50 mg by mouth daily      Multiple Vitamins-Minerals (ALIVE WOMENS GUMMY) CHEW Take 2 tablets by mouth daily      B Complex Vitamins (VITAMIN B COMPLEX PO) Take by mouth      potassium chloride (KLOR-CON) 20 MEQ packet Take 40 mEq by mouth daily.  fluticasone-salmeterol (ADVAIR) 250-50 MCG/DOSE AEPB Inhale 1 puff into the lungs every 12 hours.  Rivaroxaban (XARELTO PO) Take 20 mg by mouth daily.       vitamin D (ERGOCALCIFEROL) 50933 UNITS CAPS capsule Take 10,000 Units by mouth Twice a Week        Allergies   Allergen Reactions    Dye [Iodides]     Invokana [Canagliflozin] Other (See Comments)    Liraglutide     Adhesive Tape Dermatitis     Tears her skin, skin red and irritated     Nursing Notes Reviewed    Physical Exam:  ED Triage Vitals [06/25/22 1506]   Enc Vitals Group      /60      Heart Rate 62      Resp 18      Temp 98.1 °F (36.7 °C)      Temp Source Oral      SpO2 99 %      Weight 190 lb (86.2 kg)      Height 5' 2.5\" (1.588 m)      Head Circumference       Peak Flow       Pain Score       Pain Loc       Pain Edu? Excl. in [address]? GENERAL APPEARANCE: Awake and alert. Cooperative. No acute distress. Nontoxic in appearance  HEAD: Normocephalic. Atraumatic. EYES: Sclera anicteric. ENT: Tolerates saliva. No difficulty swallowing or handling secretions. NECK: Supple. Trachea midline. LUNGS: Respirations unlabored. Clear to auscultation bilaterally without wheezes rales or rhonchi. EXTREMITIES: No acute deformities. SKIN: Warm and dry. Patient has a urticarial area where she was stung by an insect present on her right elbow. There is no other urticaria that can be seen across the patient's body. NEUROLOGICAL: No gross facial drooping. PSYCHIATRIC: Normal mood. Labs:  No results found for this visit on 06/25/22. Radiographs (if obtained):  [] The following radiograph was interpreted by myself in the absence of a radiologist:  [x] Radiologist's Report reviewed at time of ED visit:  No orders to display       ED Course and MDM:  Patient presents to the emergency department after being stung by an insect. She had a welt on her right arm. She was given 50 of Benadryl orally and had ice applied to the area and is feeling much better. I would like to keep her on some steroids for the next few days. She will be given 40 mg orally here in the ER. She has been warned this will make her blood sugar increase. She will be discharged stable condition with prescription for Medrol Dosepak and for a prescription for hydrocortisone cream to use topically.   She is discharged stable condition as instructed return for any problems or concerns and she is instructed to follow-up with her primary caregiver within the next week. I see no evidence of anaphylaxis or an acute allergic response systemically. Final Impression:  1. Infected insect bite or sting      DISPOSITION Decision To Discharge    Patient referred to:  Quentin Elder MD  [address] [id number]    Schedule an appointment as soon as possible for a visit in 1 week  For follow up    [facility]  [facility] [place] [address]  [facility] [facility]  [phone]  Go to   If symptoms worsen    Discharge medications:  New Prescriptions    HYDROCORTISONE (V-R HYDROCORTISONE/ALOE) 0.5 % OINTMENT    Apply topically 2 times daily. METHYLPREDNISOLONE (MEDROL, TOÑO,) 4 MG TABLET    Take by mouth.      (Please note that portions of this note may have been completed with a voice recognition program. Efforts were made to edit the dictations but occasionally words are mis-transcribed.)    China Hickey DO, [facility]  Board certified in [address] [place] [address]  06/25/22 1205

## 2022-06-25 NOTE — ED NOTES
THE PATIENTS DAUGHTER, Johanna WARNER CALLED INQUIRING ABOUT THE TREATMENT HER MOTHER RECEIVED TODAY AS SHE IS ACTING VERY TIRED. SHE WAS NOTIFIED THAT THE PATIENT DID RECEIVE BENADRYL AND PREDNISONE.                 Ysabel Russo RN  06/25/22 0813

## 2022-06-25 NOTE — ED NOTES
Discharge education reviewed. Questions answered. Medications clarified. Belongings gathered. Discharge instructions signed. All belongings accounted for. Patient discharged to home via POV.       Cirilo Lucero RN  06/25/22 2753

## 2022-09-26 ENCOUNTER — TRANSCRIBE ORDERS (OUTPATIENT)
Dept: ADMINISTRATIVE | Age: 67
End: 2022-09-26

## 2022-09-26 DIAGNOSIS — D49.9 NEOPLASM OF UNSPECIFIED BEHAVIOR OF UNSPECIFIED SITE: Primary | ICD-10-CM

## 2022-10-04 ENCOUNTER — HOSPITAL ENCOUNTER (OUTPATIENT)
Dept: CT IMAGING | Age: 67
Discharge: HOME | End: 2022-10-04
Payer: MEDICARE

## 2022-10-04 DIAGNOSIS — D49.9 NEOPLASM OF UNSPECIFIED BEHAVIOR OF UNSPECIFIED SITE: ICD-10-CM

## 2022-10-04 PROCEDURE — 74177 CT ABD & PELVIS W/CONTRAST: CPT

## 2022-10-04 PROCEDURE — 6360000004 HC RX CONTRAST MEDICATION: Performed by: INTERNAL MEDICINE

## 2022-10-04 PROCEDURE — A4641 RADIOPHARM DX AGENT NOC: HCPCS | Performed by: INTERNAL MEDICINE

## 2022-10-04 RX ADMIN — BARIUM SULFATE 900 ML: 21 SUSPENSION ORAL at 11:29

## 2022-10-04 RX ADMIN — IOPAMIDOL 75 ML: 755 INJECTION, SOLUTION INTRAVENOUS at 11:29

## 2022-12-14 ENCOUNTER — APPOINTMENT (OUTPATIENT)
Dept: GENERAL RADIOLOGY | Age: 67
End: 2022-12-14
Payer: MEDICARE

## 2022-12-14 ENCOUNTER — HOSPITAL ENCOUNTER (EMERGENCY)
Age: 67
Discharge: HOME | End: 2022-12-14
Attending: EMERGENCY MEDICINE
Payer: MEDICARE

## 2022-12-14 VITALS
BODY MASS INDEX: 33.66 KG/M2 | HEIGHT: 63 IN | WEIGHT: 190 LBS | TEMPERATURE: 99 F | RESPIRATION RATE: 18 BRPM | OXYGEN SATURATION: 94 % | HEART RATE: 75 BPM | DIASTOLIC BLOOD PRESSURE: 73 MMHG | SYSTOLIC BLOOD PRESSURE: 138 MMHG

## 2022-12-14 DIAGNOSIS — E86.0 DEHYDRATION: ICD-10-CM

## 2022-12-14 DIAGNOSIS — J06.9 VIRAL URI WITH COUGH: ICD-10-CM

## 2022-12-14 DIAGNOSIS — J10.1 INFLUENZA A: Primary | ICD-10-CM

## 2022-12-14 DIAGNOSIS — J11.1 INFLUENZA WITH RESPIRATORY MANIFESTATION OTHER THAN PNEUMONIA: ICD-10-CM

## 2022-12-14 LAB
RAPID INFLUENZA  B AGN: NEGATIVE
RAPID INFLUENZA A AGN: POSITIVE
SARS-COV-2, NAAT: NOT DETECTED
SOURCE: NORMAL

## 2022-12-14 PROCEDURE — 87804 INFLUENZA ASSAY W/OPTIC: CPT

## 2022-12-14 PROCEDURE — 87635 SARS-COV-2 COVID-19 AMP PRB: CPT

## 2022-12-14 PROCEDURE — 6370000000 HC RX 637 (ALT 250 FOR IP): Performed by: EMERGENCY MEDICINE

## 2022-12-14 PROCEDURE — 99284 EMERGENCY DEPT VISIT MOD MDM: CPT

## 2022-12-14 PROCEDURE — 6360000002 HC RX W HCPCS: Performed by: EMERGENCY MEDICINE

## 2022-12-14 PROCEDURE — 71046 X-RAY EXAM CHEST 2 VIEWS: CPT

## 2022-12-14 PROCEDURE — 94640 AIRWAY INHALATION TREATMENT: CPT

## 2022-12-14 RX ORDER — PREDNISONE 20 MG/1
40 TABLET ORAL DAILY
Status: DISCONTINUED | OUTPATIENT
Start: 2022-12-14 | End: 2022-12-14 | Stop reason: HOSPADM

## 2022-12-14 RX ORDER — OSELTAMIVIR PHOSPHATE 75 MG/1
75 CAPSULE ORAL 2 TIMES DAILY
Qty: 10 CAPSULE | Refills: 0 | Status: SHIPPED | OUTPATIENT
Start: 2022-12-14 | End: 2022-12-19

## 2022-12-14 RX ORDER — BENZONATATE 100 MG/1
100 CAPSULE ORAL 2 TIMES DAILY PRN
Qty: 20 CAPSULE | Refills: 0 | Status: SHIPPED | OUTPATIENT
Start: 2022-12-14 | End: 2022-12-21

## 2022-12-14 RX ORDER — ALBUTEROL SULFATE 2.5 MG/3ML
2.5 SOLUTION RESPIRATORY (INHALATION) ONCE
Status: COMPLETED | OUTPATIENT
Start: 2022-12-14 | End: 2022-12-14

## 2022-12-14 RX ORDER — PREDNISONE 20 MG/1
40 TABLET ORAL DAILY
Qty: 20 TABLET | Refills: 0 | Status: SHIPPED | OUTPATIENT
Start: 2022-12-14 | End: 2022-12-17

## 2022-12-14 RX ADMIN — ALBUTEROL SULFATE 2.5 MG: 2.5 SOLUTION RESPIRATORY (INHALATION) at 09:29

## 2022-12-14 RX ADMIN — PREDNISONE 40 MG: 20 TABLET ORAL at 09:19

## 2022-12-14 ASSESSMENT — PAIN - FUNCTIONAL ASSESSMENT: PAIN_FUNCTIONAL_ASSESSMENT: NONE - DENIES PAIN

## 2022-12-14 NOTE — ED TRIAGE NOTES
Pt has concerns she may have to flu or covid pt states she is not vaccinated from either , but was recently at the hospital visiting her son

## 2022-12-14 NOTE — ED NOTES
Pt ambulated from the ed with a steady gait, pt was educated on home care and medications, pt denies any questions.       Robert Cerrato RN  12/14/22 7015

## 2022-12-14 NOTE — ED PROVIDER NOTES
[facility] [id number]      Pt Name: Shelia Macias  MRN: [id number]  [place] 1955  Date of evaluation: 12/14/2022  Provider: Rowdy Saldana MD  Insurance:  Payor: Ana Paula Coker / Plan: Stacy Renwick / Product Type: *No Product type* /   Current Code Status   Code Status: Prior     [address]       Chief Complaint   Patient presents with    Chills    Nasal Congestion    Fatigue    Cough         HISTORY OF PRESENT ILLNESS    HPI    Nursing Notes were reviewed. This is a 69 y.o. female who presents to the emergency department with Verlon Perez symptoms including rhinorrhea, congestion, fevers and muscle aches. Patient says her symptoms started approximately 24 hours ago. She describes an episodic nonproductive cough. She denies nausea or vomiting. He denies difficulty breathing. She denies chest pain. She denies abdominal pain. She denies dysuria and frequency. Patient is unvaccinated against influenza and COVID-19    REVIEW OF SYSTEMS       Review of Systems    10 Systems were reviewed with this patient and all were negative with exception of those noted in the history of present illness above. PAST MEDICAL HISTORY     Past Medical History:   Diagnosis Date    Acid reflux     Arthritis     Asthma     [facility]est[facility]) [facility]     Patient dneies - states she does not have CHF.     CKD (chronic kidney disease) stage 3, GFR 30-59 ml/min ([facility])     Diabetes mellitus ([facility])     DVT (deep venous thrombosis) ([facility]     Hypertension     Sleep apnea          SURGICAL HISTORY       Past Surgical History:   Procedure Laterality Date    APPENDECTOMY      BACK SURGERY      neck x4    CHOLECYSTECTOMY      COLONOSCOPY      DILATION AND CURETTAGE OF UTERUS  x2    EYE SURGERY      bilateral cataracts    HYSTERECTOMY (CERVIX STATUS UNKNOWN)      KERATOPLASTY      KNEE SURGERY      both legs    AYAN-EN-Y GASTRIC BYPASS TONSILLECTOMY           CURRENT MEDICATIONS       Previous Medications    ASCORBIC ACID (VITAMIN C) 500 MG CAPS    Take by mouth    B COMPLEX VITAMINS (VITAMIN B COMPLEX PO)    Take by mouth    BIOTIN 1000 MCG TABS    Take 1 mg by mouth    CALCIUM CARBONATE (OSCAL) 500 MG TABS TABLET    Take 2 tablets by mouth    CLOTRIMAZOLE (ALEVAZOL) 1 % OINT    Apply topically    CYANOCOBALAMIN (VITAMIN B-12) 2500 MCG SUBL    Place 1 each under the tongue    ESCITALOPRAM (LEXAPRO) 10 MG TABLET    Take 10 mg by mouth daily    FLUTICASONE (FLONASE) 50 MCG/ACT NASAL SPRAY    1 spray by Nasal route daily as needed    FLUTICASONE-SALMETEROL (ADVAIR) 250-50 MCG/DOSE AEPB    Inhale 1 puff into the lungs every 12 hours. HYDROCORTISONE (V-R HYDROCORTISONE/ALOE) 0.5 % OINTMENT    Apply topically 2 times daily. MAGNESIUM OXIDE (MAG-OX) 400 MG TABLET    Take 400 mg by mouth daily    MELATONIN 10 MG CAPS CAPSULE    Take 10 mg by mouth nightly    METHYLPREDNISOLONE (MEDROL, TOÑO,) 4 MG TABLET    Take by mouth. MULTIPLE VITAMINS-MINERALS (ALIVE WOMENS GUMMY) CHEW    Take 2 tablets by mouth daily    NAPHAZOLINE-PHENIRAMINE (NAPHCON-A) 0.025-0.3 % OPHTHALMIC SOLUTION    Place 1 drop into the right eye 4 times daily    NAPROXEN (NAPROSYN) 500 MG TABLET    Take 1 tablet by mouth 2 times daily    POLYSACCHARIDE IRON COMPLEX (PROFE PO)    Take 180 mg by mouth    POTASSIUM CHLORIDE (KLOR-CON) 20 MEQ PACKET    Take 40 mEq by mouth daily. RIVAROXABAN (XARELTO PO)    Take 20 mg by mouth daily. TRAZODONE (DESYREL) 50 MG TABLET    Take 50 mg by mouth nightly    VITAMIN D (ERGOCALCIFEROL) 77244 UNITS CAPS CAPSULE    Take 10,000 Units by mouth Twice a Week     WARFARIN SODIUM PO    Take by mouth    ZINC GLUCONATE 50 MG TABLET    Take 50 mg by mouth daily       ALLERGIES     Dye [iodides], Invokana [canagliflozin], Liraglutide, and Adhesive tape    FAMILY HISTORY     History reviewed. No pertinent family history.        SOCIAL HISTORY Social History     Socioeconomic History    Marital status:      Spouse name: None    Number of children: None    Years of education: None    Highest education level: None   Tobacco Use    Smoking status: Never    Smokeless tobacco: Never   Substance and Sexual Activity    Alcohol use: No    Drug use: No       PHYSICAL EXAM       ED Triage Vitals   BP Temp Temp Source Heart Rate Resp SpO2 Height Weight   12/14/22 0850 12/14/22 0850 12/14/22 0850 12/14/22 0850 12/14/22 0850 12/14/22 0850 12/14/22 0851 12/14/22 0851   (!) 141/62 99.4 °F (37.4 °C) Oral 77 18 99 % 5' 2.5\" (1.588 m) 190 lb (86.2 kg)       Physical Exam  Vitals and nursing note reviewed. Constitutional:       General: She is not in acute distress. Appearance: Normal appearance. She is not ill-appearing, toxic-appearing or diaphoretic. HENT:      Head: Normocephalic. Nose: Congestion and rhinorrhea present. Mouth/Throat:      Mouth: Mucous membranes are dry. Eyes:      Extraocular Movements: Extraocular movements intact. Cardiovascular:      Rate and Rhythm: Normal rate and regular rhythm. Pulses: Normal pulses. Heart sounds: Normal heart sounds. Pulmonary:      Breath sounds: Wheezing present. Abdominal:      Palpations: Abdomen is soft. Tenderness: There is no abdominal tenderness. Musculoskeletal:         General: Normal range of motion. Cervical back: Normal range of motion. Right lower leg: No edema. Left lower leg: No edema. Skin:     General: Skin is warm and dry. Capillary Refill: Capillary refill takes less than 2 seconds. Neurological:      General: No focal deficit present. Mental Status: She is alert and oriented to person, place, and time.    Psychiatric:         Mood and Affect: Mood normal.         Behavior: Behavior normal.       Vitals:    Vitals:    12/14/22 0850 12/14/22 0851 12/14/22 0932   BP: (!) 141/62 (!) 141/60    Pulse: 77 77    Resp: 18 18    Temp: 99.4 °F (37.4 °C)     TempSrc: Oral     SpO2: 99% 98% 92%   Weight:  190 lb (86.2 kg)    Height:  5' 2.5\" (1.588 m)        DIAGNOSTIC RESULTS       RADIOLOGY:   Non-plain film images such as CT, Ultrasound and MRI are read by the radiologist. Plain radiographic images are visualized and preliminarily interpreted by the emergency physician with the below findings:    Interpretation per the Radiologist below, if available at the time of this note:    XR CHEST (2 VW)   Final Result   No acute cardiopulmonary findings. LABS:  Labs Reviewed   RAPID INFLUENZA A/B ANTIGENS - Abnormal; Notable for the following components:       Result Value    Rapid Influenza A Ag POSITIVE (*)     All other components within normal limits   COVID-19, RAPID       All other labs were within normal range or not returned as of this dictation. MEDICAL DECISION MAKING     Medications   predniSONE (DELTASONE) tablet 40 mg (40 mg Oral Given 12/14/22 0919)   albuterol (PROVENTIL) nebulizer solution 2.5 mg (2.5 mg Nebulization Given 12/14/22 0929)             Denice Coma Scale  Eye Opening: Spontaneous  Best Verbal Response: Oriented  Best Motor Response: Obeys commands  Denice Coma Scale Score: 15                     CIWA Assessment  BP: (!) 141/60  Heart Rate: 77                 MDM  Signs and symptoms are consistent with upper respiratory tract infection. Chest x-ray does not demonstrate any evidence of pneumonia or pleural effusion. Physical exam does not demonstrate any pharyngeal erythema, tonsillar exudate or other findings consistent with bacterial Pharyngitis. Laboratory evaluation does not demonstrate any evidence of Covid-19 infection. The patient is positive for influenza A. Patient has been educated regarding influenza infection. She will be discharged home with instruction to follow-up with primary care physician soon as possible. She has been prescribed antitussive medication.   She has been instructed to come back to the emergency department he has any worsening symptoms including difficulty breathing or any other concerning symptoms        Clinical Diagnoses Addressed  1. Influenza A    2. Influenza with respiratory manifestation other than pneumonia    3. Viral URI with cough    4. Dehydration               CONSULTS:  None    PROCEDURES:  Unless otherwise noted below, none     Procedures      FINAL IMPRESSION      1. Influenza A    2. Influenza with respiratory manifestation other than pneumonia    3. Viral URI with cough    4. Dehydration          DISPOSITION/PLAN   DISPOSITION Decision To Discharge 12/14/2022 09:53:37 AM      PATIENT REFERRED TO:  Mikala Rodriguez MD  [address] [place] [address]  Jozef Ram  [phone]    Call today      DISCHARGE MEDICATIONS:  New Prescriptions    BENZONATATE (TESSALON) 100 MG CAPSULE    Take 1 capsule by mouth 2 times daily as needed for Cough    OSELTAMIVIR (TAMIFLU) 75 MG CAPSULE    Take 1 capsule by mouth 2 times daily for 5 days    PREDNISONE (DELTASONE) 20 MG TABLET    Take 2 tablets by mouth daily for 3 days     Controlled Substances Monitoring:     No flowsheet data found.     Salinas Joe MD (electronically signed)  Attending Emergency Physician            Salinas Joe MD  12/14/22 7510

## 2023-11-13 NOTE — PROGRESS NOTES
6/22/2021  sleep study  for Emeka Robbins  1955 is complete. Results are pending physician review.     Electronically signed by Jorge Johnson on 6/22/2021 at 2:51 AM Interval History: Afebrile since 11/12 AM. Stool formed per nursing staff. No suspicion for C diff at this time. More frequent BM expected with antibiotics. Will transition from ceftriaxone to amoxicillin for remainder of Actinomyces course. PO doxy for empiric pneumonia coverage.     Review of Systems   Respiratory:  Positive for cough and shortness of breath.    Gastrointestinal:  Positive for diarrhea.   Allergic/Immunologic: Positive for immunocompromised state.   All other systems reviewed and are negative.    Objective:     Vital Signs (Most Recent):  Temp: 98.2 °F (36.8 °C) (11/13/23 0732)  Pulse: 108 (11/13/23 0800)  Resp: 18 (11/13/23 0814)  BP: (!) 105/55 (11/13/23 0732)  SpO2: 96 % (11/13/23 0732) Vital Signs (24h Range):  Temp:  [98.2 °F (36.8 °C)-99 °F (37.2 °C)] 98.2 °F (36.8 °C)  Pulse:  [] 108  Resp:  [17-20] 18  SpO2:  [91 %-99 %] 96 %  BP: ()/(55-71) 105/55     Weight: 99.5 kg (219 lb 5.7 oz)  Body mass index is 35.41 kg/m².    Estimated Creatinine Clearance: 94.6 mL/min (based on SCr of 0.8 mg/dL).     Physical Exam  Constitutional:       General: She is not in acute distress.     Appearance: Normal appearance. She is not ill-appearing.   Cardiovascular:      Rate and Rhythm: Normal rate and regular rhythm.      Pulses: Normal pulses.      Heart sounds: Normal heart sounds. No murmur heard.     No friction rub. No gallop.   Pulmonary:      Effort: Pulmonary effort is normal. No respiratory distress.      Breath sounds: Normal breath sounds.      Comments: Vapotherm   Abdominal:      General: Abdomen is flat. Bowel sounds are normal. There is no distension.      Palpations: Abdomen is soft.      Tenderness: There is no abdominal tenderness.   Skin:     General: Skin is warm and dry.   Neurological:      Mental Status: She is alert.          Significant Labs: Blood Culture:   Recent Labs   Lab 10/11/23  1318 10/11/23  1326 11/01/23  1250 11/01/23  1450 11/12/23  0907   LABBLOO No growth  after 5 days. No growth after 5 days. No growth after 5 days. No growth after 5 days. No Growth to date  No Growth to date     CBC:   Recent Labs   Lab 11/12/23 0624   WBC 13.77*   HGB 9.9*   HCT 32.7*        CMP:   Recent Labs   Lab 11/12/23 0624      K 3.9      CO2 27   GLU 94   BUN 7   CREATININE 0.8   CALCIUM 8.8   ANIONGAP 11     Microbiology Results (last 7 days)       Procedure Component Value Units Date/Time    Stool culture [[id number]] Collected: 11/12/23 1212    Order Status: Sent Specimen: Stool Updated: 11/13/23 0232    E. coli 0157 antigen [[id number]] Collected: 11/12/23 1212    Order Status: No result Specimen: Stool Updated: 11/13/23 0232    Blood culture [[id number]] Collected: 11/12/23 0907    Order Status: Completed Specimen: Blood Updated: 11/12/23 2115     Blood Culture, Routine No Growth to date    Narrative:      1 of 2  Please collect from separate site as 2 of 2    Blood culture [[id number]] Collected: 11/12/23 0907    Order Status: Completed Specimen: Blood Updated: 11/12/23 2115     Blood Culture, Routine No Growth to date    Narrative:      2 of 2  Please collect from separate site as 1 of 2    Clostridium difficile EIA [[id number]] Collected: 11/12/23 1931    Order Status: Canceled Specimen: Stool     Clostridium difficile EIA [[id number]] Collected: 11/12/23 1212    Order Status: Canceled Specimen: Stool     Blood Culture #1 **CANNOT BE ORDERED STAT** [[id number]] Collected: 11/01/23 1450    Order Status: Completed Specimen: Blood Updated: 11/06/23 2212     Blood Culture, Routine No growth after 5 days.    Blood culture #2 **CANNOT BE ORDERED STAT** [[id number]] Collected: 11/01/23 1250    Order Status: Completed Specimen: Blood from Line, PICC Right Basilic Updated: 11/06/23 2212     Blood Culture, Routine No growth after 5 days.          All pertinent labs within the past 24 hours have been reviewed.    Significant Imaging: I have reviewed all pertinent imaging  results/findings within the past 24 hours.

## 2024-12-30 ENCOUNTER — HOSPITAL ENCOUNTER (EMERGENCY)
Age: 69
Discharge: HOME | End: 2024-12-30
Attending: EMERGENCY MEDICINE
Payer: COMMERCIAL

## 2024-12-30 VITALS
OXYGEN SATURATION: 96 % | BODY MASS INDEX: 23.04 KG/M2 | SYSTOLIC BLOOD PRESSURE: 153 MMHG | DIASTOLIC BLOOD PRESSURE: 74 MMHG | TEMPERATURE: 98 F | RESPIRATION RATE: 18 BRPM | HEART RATE: 59 BPM | WEIGHT: 130 LBS | HEIGHT: 63 IN

## 2024-12-30 DIAGNOSIS — R21 RASH AND OTHER NONSPECIFIC SKIN ERUPTION: Primary | ICD-10-CM

## 2024-12-30 PROCEDURE — 99283 EMERGENCY DEPT VISIT LOW MDM: CPT

## 2024-12-30 RX ORDER — AMMONIUM LACTATE 12 G/100G
LOTION TOPICAL
Qty: 396 ML | Refills: 3 | Status: SHIPPED | OUTPATIENT
Start: 2024-12-30

## 2024-12-30 ASSESSMENT — PAIN DESCRIPTION - DESCRIPTORS: DESCRIPTORS: DISCOMFORT

## 2024-12-30 ASSESSMENT — PAIN SCALES - GENERAL: PAINLEVEL_OUTOF10: 6

## 2024-12-30 ASSESSMENT — PAIN DESCRIPTION - LOCATION: LOCATION: FACE

## 2024-12-30 ASSESSMENT — PAIN - FUNCTIONAL ASSESSMENT: PAIN_FUNCTIONAL_ASSESSMENT: 0-10

## 2024-12-30 NOTE — ED PROVIDER NOTES
Triage Chief Complaint:   Rash (Face and chest first noticed yesterday )    [place]:  Natalya Moses is a 69 y.o. female that presents to the ED with an infraorbital bilateral facial rash states is somewhat itchy.  She has not been around any any new person sick with similar symptoms no fever patient does have CKD she denies any recent meds such as antibiotics.  No lesions elsewhere in her body        Past Medical History:   Diagnosis Date    Acid reflux     Arthritis     Asthma     CHF (congestive heart failure) ([facility])     Patient dneies - states she does not have CHF.    CKD (chronic kidney disease) stage 3, GFR 30-59 ml/min ([facility])     Diabetes mellitus (HCC)     DVT (deep venous thrombosis) ([facility])     Hypertension     Sleep apnea      Past Surgical History:   Procedure Laterality Date    APPENDECTOMY      BACK SURGERY      neck x4    CHOLECYSTECTOMY      COLONOSCOPY      DILATION AND CURETTAGE OF UTERUS  x2    EYE SURGERY      bilateral cataracts    HYSTERECTOMY (CERVIX STATUS UNKNOWN)      KERATOPLASTY      KNEE SURGERY      both legs    AYAN-EN-Y GASTRIC BYPASS      TONSILLECTOMY       History reviewed. No pertinent family history.  Social History     Socioeconomic History    Marital status:      Spouse name: Not on file    Number of children: Not on file    Years of education: Not on file    Highest education level: Not on file   Occupational History    Not on file   Tobacco Use    Smoking status: Never    Smokeless tobacco: Never   Substance and Sexual Activity    Alcohol use: No    Drug use: No    Sexual activity: Not on file   Other Topics Concern    Not on file   Social History Narrative    Not on file     Social Determinants of Health     Financial Resource Strain: Low Risk  (8/27/2024)    Received from [facility]    Overall Financial Resource Strain (CARDIA)     Difficulty of Paying Living Expenses: Not very hard   Food Insecurity: No Food Insecurity (8/27/2024)    Received

## 2025-08-01 ENCOUNTER — OFFICE VISIT (OUTPATIENT)
Age: 70
End: 2025-08-01
Payer: COMMERCIAL

## 2025-08-01 VITALS
BODY MASS INDEX: 23.51 KG/M2 | DIASTOLIC BLOOD PRESSURE: 60 MMHG | OXYGEN SATURATION: 98 % | SYSTOLIC BLOOD PRESSURE: 120 MMHG | WEIGHT: 130.6 LBS | HEART RATE: 59 BPM

## 2025-08-01 DIAGNOSIS — G31.84 MCI (MILD COGNITIVE IMPAIRMENT): Primary | ICD-10-CM

## 2025-08-01 DIAGNOSIS — E55.9 VITAMIN D DEFICIENCY: ICD-10-CM

## 2025-08-01 PROCEDURE — 3074F SYST BP LT 130 MM HG: CPT | Performed by: STUDENT IN AN ORGANIZED HEALTH CARE EDUCATION/TRAINING PROGRAM

## 2025-08-01 PROCEDURE — 99204 OFFICE O/P NEW MOD 45 MIN: CPT | Performed by: STUDENT IN AN ORGANIZED HEALTH CARE EDUCATION/TRAINING PROGRAM

## 2025-08-01 PROCEDURE — 1123F ACP DISCUSS/DSCN MKR DOCD: CPT | Performed by: STUDENT IN AN ORGANIZED HEALTH CARE EDUCATION/TRAINING PROGRAM

## 2025-08-01 PROCEDURE — 3078F DIAST BP <80 MM HG: CPT | Performed by: STUDENT IN AN ORGANIZED HEALTH CARE EDUCATION/TRAINING PROGRAM

## 2025-08-01 RX ORDER — MEMANTINE HYDROCHLORIDE 10 MG/1
10 TABLET ORAL 2 TIMES DAILY
Qty: 180 TABLET | Refills: 2 | Status: SHIPPED | OUTPATIENT
Start: 2025-08-01 | End: 2025-08-01

## 2025-08-01 RX ORDER — MEMANTINE HYDROCHLORIDE 5 MG/1
5 TABLET ORAL 2 TIMES DAILY
COMMUNITY
Start: 2025-04-28 | End: 2025-08-01 | Stop reason: DRUGHIGH

## 2025-08-01 RX ORDER — MEMANTINE HYDROCHLORIDE 10 MG/1
10 TABLET ORAL 2 TIMES DAILY
Qty: 180 TABLET | Refills: 2 | Status: SHIPPED | OUTPATIENT
Start: 2025-08-01

## 2025-08-01 NOTE — PROGRESS NOTES
Consult Note  [facility]  Patient Name: Natalya Moses  : 1955        Subjective:   Reason for consult:   Patient seen and examined. Chart reviewed in detail.    69 y.o. -female with PMH CHF, CKD, DM presenting to [facility] for memory concerns. Patient is un-accompanied.     She lives at home alone but grandkids are \"in and out all the time.\" She is retired from heavy highway construction.      She reports cognitive issues were first noted 6 mo and are mostly issues with forgetting what she was trying to say in conversation, word finding difficulty.  Denies significant issues with balance/falls.    They tell me there is decline with paying bills, cooking, cleaning, bathing, dressing, feeding. She does drive and there have been no accidents or getting lost.     Denies dangerous accidents such as wandering off, leaving on burners.     No hallucinations. No aggression.     Some memory loss in mom.     She is on Namenda for several years and does find it helpful. Has not tried higher dose.     MMSE is  = 25/30 performed 25  Orientation = 9/10 (-1 day of week)  Registration = 3/3  Naming = 2/2  Repetition = 0/1  WORLD = 5/5  Following a three-step command = 3/3  Recall = 0/3  Following a written command =   Copying two intersecting pentagons =   Writing a sentence =      MRI head  with atrophy and microvascular ischemic change    Recent lab work reviewed: B12, folate, vitamin D, TSH have not been checked in several years.        2021    10:39 PM 2021    10:37 PM 2021    10:54 AM 2018     8:40 PM 2018    11:16 AM 2018    11:15 AM   Sleep Medicine   Sitting and reading 3  3 2 2    Watching TV 2  2 2 1    Sitting, inactive in a public place (e.g. a theatre or a meeting) 0  0 2 1    As a passenger in a car for an hour without a break 2  2 0 2    Lying down to rest in the afternoon when circumstances permit 0  0 2 3    Sitting and talking to